# Patient Record
Sex: MALE | Race: WHITE | NOT HISPANIC OR LATINO | Employment: FULL TIME | URBAN - METROPOLITAN AREA
[De-identification: names, ages, dates, MRNs, and addresses within clinical notes are randomized per-mention and may not be internally consistent; named-entity substitution may affect disease eponyms.]

---

## 2020-04-21 DIAGNOSIS — Z01.84 ANTIBODY RESPONSE EXAMINATION: ICD-10-CM

## 2020-05-14 ENCOUNTER — LAB VISIT (OUTPATIENT)
Dept: LAB | Facility: OTHER | Age: 30
End: 2020-05-14
Attending: INTERNAL MEDICINE
Payer: COMMERCIAL

## 2020-05-14 DIAGNOSIS — Z01.84 ANTIBODY RESPONSE EXAMINATION: ICD-10-CM

## 2020-05-14 LAB — SARS-COV-2 IGG SERPLBLD QL IA.RAPID: NEGATIVE

## 2020-05-14 PROCEDURE — 36415 COLL VENOUS BLD VENIPUNCTURE: CPT

## 2020-05-14 PROCEDURE — 86769 SARS-COV-2 COVID-19 ANTIBODY: CPT

## 2020-12-02 ENCOUNTER — CLINICAL SUPPORT (OUTPATIENT)
Dept: URGENT CARE | Facility: CLINIC | Age: 30
End: 2020-12-02
Payer: COMMERCIAL

## 2020-12-02 DIAGNOSIS — Z11.9 SCREENING EXAMINATION FOR UNSPECIFIED INFECTIOUS DISEASE: Primary | ICD-10-CM

## 2020-12-02 LAB
CTP QC/QA: YES
SARS-COV-2 RDRP RESP QL NAA+PROBE: NEGATIVE

## 2020-12-02 PROCEDURE — U0002: ICD-10-PCS | Mod: QW,S$GLB,, | Performed by: FAMILY MEDICINE

## 2020-12-02 PROCEDURE — U0002 COVID-19 LAB TEST NON-CDC: HCPCS | Mod: QW,S$GLB,, | Performed by: FAMILY MEDICINE

## 2020-12-02 PROCEDURE — 99211 PR OFFICE/OUTPT VISIT, EST, LEVL I: ICD-10-PCS | Mod: S$GLB,CS,, | Performed by: FAMILY MEDICINE

## 2020-12-02 PROCEDURE — 99211 OFF/OP EST MAY X REQ PHY/QHP: CPT | Mod: S$GLB,CS,, | Performed by: FAMILY MEDICINE

## 2020-12-02 NOTE — PROGRESS NOTES
Subjective:       Patient ID: Joey Ordaz is a 30 y.o. male.    Chief Complaint: Essential Covid Test    Essential covid test. This is not being done through employee health. [facility]    [place]     Objective:      Physical Exam    Assessment:       1. Screening examination for unspecified infectious disease        Plan:                   No follow-ups on file.

## 2020-12-22 ENCOUNTER — IMMUNIZATION (OUTPATIENT)
Dept: INTERNAL MEDICINE | Facility: CLINIC | Age: 30
End: 2020-12-22
Payer: COMMERCIAL

## 2020-12-22 DIAGNOSIS — Z23 NEED FOR VACCINATION: ICD-10-CM

## 2020-12-22 PROCEDURE — 91300 COVID-19, MRNA, LNP-S, PF, 30 MCG/0.3 ML DOSE VACCINE: CPT | Mod: ,,, | Performed by: INTERNAL MEDICINE

## 2020-12-22 PROCEDURE — 0001A COVID-19, MRNA, LNP-S, PF, 30 MCG/0.3 ML DOSE VACCINE: ICD-10-PCS | Mod: CV19,,, | Performed by: INTERNAL MEDICINE

## 2020-12-22 PROCEDURE — 91300 COVID-19, MRNA, LNP-S, PF, 30 MCG/0.3 ML DOSE VACCINE: ICD-10-PCS | Mod: ,,, | Performed by: INTERNAL MEDICINE

## 2020-12-22 PROCEDURE — 0001A COVID-19, MRNA, LNP-S, PF, 30 MCG/0.3 ML DOSE VACCINE: CPT | Mod: CV19,,, | Performed by: INTERNAL MEDICINE

## 2020-12-31 ENCOUNTER — OFFICE VISIT (OUTPATIENT)
Dept: URGENT CARE | Facility: CLINIC | Age: 30
End: 2020-12-31
Payer: COMMERCIAL

## 2020-12-31 VITALS
WEIGHT: 140 LBS | BODY MASS INDEX: 20.04 KG/M2 | OXYGEN SATURATION: 97 % | TEMPERATURE: 98 F | SYSTOLIC BLOOD PRESSURE: 95 MMHG | DIASTOLIC BLOOD PRESSURE: 68 MMHG | HEIGHT: 70 IN | HEART RATE: 95 BPM | RESPIRATION RATE: 16 BRPM

## 2020-12-31 DIAGNOSIS — K13.0 LESION OF LIP: Primary | ICD-10-CM

## 2020-12-31 PROCEDURE — 99213 PR OFFICE/OUTPT VISIT, EST, LEVL III, 20-29 MIN: ICD-10-PCS | Mod: S$GLB,,, | Performed by: PHYSICIAN ASSISTANT

## 2020-12-31 PROCEDURE — 99213 OFFICE O/P EST LOW 20 MIN: CPT | Mod: S$GLB,,, | Performed by: PHYSICIAN ASSISTANT

## 2020-12-31 PROCEDURE — 87529 HSV DNA AMP PROBE: CPT

## 2020-12-31 RX ORDER — MUPIROCIN 20 MG/G
OINTMENT TOPICAL 3 TIMES DAILY
Qty: 1 TUBE | Refills: 0 | Status: SHIPPED | OUTPATIENT
Start: 2020-12-31 | End: 2021-01-05

## 2020-12-31 RX ORDER — ACYCLOVIR 400 MG/1
400 TABLET ORAL 3 TIMES DAILY
Qty: 30 TABLET | Refills: 0 | Status: SHIPPED | OUTPATIENT
Start: 2020-12-31 | End: 2021-01-07

## 2020-12-31 NOTE — PATIENT INSTRUCTIONS
Understanding Cold Sores  Cold sores are small blisters or sores on the lip or sometimes inside the mouth. Many people get them from time to time. Cold sores usually are not serious, and they usually heal in a week or two. They are caused by 2 related viruses, herpes simplex type 1 and 2. These viruses spread very easily. Many people have one or both of these viruses in their body. More than 4 in every 5 people are infected with herpes simplex type 1. Once you have the virus that causes cold sores, it stays in your body for the rest of your life. But it can be inactive for long periods.  What causes a cold sore?  Cold sores are usually caused by herpes simplex virus type 1. Less often, they are caused by herpes simplex virus type 2. Herpes simplex virus type 2 is the more common cause of genital sores. The herpes viruses can enter the body through a break in the skin such as a scrape. Or they may enter through mucous membranes such as the lips or mouth. Some ways to get the viruses include:  · Kissing someone who has a cold sore  · Sharing a drinking glass, eating utensils, or lip balm with someone who has a cold sore  · Having oral sex with someone who has a cold sore  A  baby can also get the infection at birth.  If you have a herpes virus, you can to pass it along even when you dont have a sore.  Cold sores flare up occasionally. Things that can cause an outbreak include:  · Sun exposure  · Fever  · Stress or exhaustion  · Menstruation  · Skin irritation  · Another unrelated Illness such as pneumonia, urinary infection, or cancer  What are the symptoms of a cold sore?  Symptoms can include:  · A blister-like sore or cluster of sores. These often occur at the edge of the lips but may appear inside the mouth.  · Skin redness around the sores.  · Pain or itching in the area of the outbreak. Often the pain or itching develops 12 to 24 hours before the sore become visible.  · Flu-like symptoms, including  swollen glands, headache, body ache, or fever. These typically occur only at the time of the first infection.  Cold sores may also occur on fingers. They may rarely infect the eyes, a serious possible complication.  Some people have symptoms a day or two before an outbreak. They may feel tenderness, burning, itching, or tingling before a cold sore appears. Cold sores tend to come back in the same area that they first appeared.  How are cold sores treated?  Treatment for cold sores focuses on relieving and shortening symptoms. For people with frequent outbreaks, treatment works to decrease how often future episodes.  Treatments may include:  · Prescription or over-the-counter pain medicines. These can help with discomfort, especially if sores are inside the mouth.  · Antiviral medicines. These may be pills that are taken by mouth or a cream to apply to sores. They may help shorten an outbreak and reduce the severity of symptoms.  They may be used to help prevent future outbreaks if you have disabling recurrent infections.  · Self-care such as extra rest and drinking more fluids. These may help relieve the flu-like symptoms of a first outbreak.  How are cold sores diagnosed?  Your healthcare provider makes the diagnosis mainly by looking at the sores and doing a clinical exam.  This may be confirmed by swab tests or blood tests.  How can I prevent cold sores?  You can help reduce the spread of the herpes viruses that cause cold sores. This can help both you and others avoid getting cold sores. Follow these tips:  · Do not kiss others if you have a cold sore. Also avoid kissing someone with a cold sore.  · Do not share eating utensils, lip balm, razors, or towels with someone who has a cold sore.  · Wash your hands after touching the area of a cold sore. The herpes virus can be carried from your face to your hands when you touch the area of a cold sore. When this happens, wash your hands thoroughly, for at least 20  seconds. When you cant wash with soap and water, use an alcohol-based hand .  · Disinfect things you touch often, such as phones and keyboards.    · If you feel a cold sore coming on, do the same things you would do when a cold sore is present to avoid spreading the virus.  · Use condoms to help prevent passing on the viruses through sex.  What are the possible complications of a cold sore?  Cold sores usually go away by themselves within 2 weeks. For most people cold sores are not serious. The viruses that cause cold sores can cause more serious illness, though. People who have a weak immune system may get more serious infections from herpes viruses. These include people being treated for cancer or who have HIV disease. Babies may also become very ill from a herpes infection.      When should I call my healthcare provider?  Call your healthcare provider right away if you have any of these:  · Fever of 100.4°F (38°C) or higher, or as directed  · Pain that gets worse  · You cannot eat or drink because of painful sores  · Symptoms dont get better within 5 to 7 days  · Blisters spread beyond the mouth or lip to areas on the chest, arms, face, or legs   Date Last Reviewed: 3/28/2016  © 2935-6597 "[employer]. [address] [place] [address], [place] [address]. All rights reserved. This information is not intended as a substitute for professional medical care. Always follow your healthcare professional's instructions.

## 2020-12-31 NOTE — PROGRESS NOTES
"Subjective:       Patient ID: Joey Ordaz is a 30 y.o. male.    Vitals:  height is 5' 10" (1.778 m) and weight is 63.5 kg (140 lb). His temperature is 98 °F (36.7 °C). His blood pressure is 95/68 and his pulse is 95. His respiration is 16 and oxygen saturation is 97%.     Chief Complaint: Mass (upper lip)    Pt c/o mass to upper lip that he noticed about 3-4 days ago. He states that he is concerned that it is HSV. He has states he had an outbreak for oral herpes as a young child. He states he is concerned that it is highly contagious and wants to be sure for his girlfriend's sake that it is not HSV. The patient denies pain, prodromal tingling, clear and pus like drainage, fever, or chills.         Mass  This is a new problem. Episode onset: three days. The problem occurs intermittently. The problem has been gradually worsening. Pertinent negatives include no arthralgias, chest pain, chills, congestion, coughing, fatigue, fever, headaches, joint swelling, myalgias, nausea, rash, sore throat, vertigo or vomiting. Nothing aggravates the symptoms. He has tried nothing for the symptoms.       Constitution: Negative for chills, fatigue and fever.   HENT: Negative for congestion and sore throat.    Neck: Negative for painful lymph nodes.   Cardiovascular: Negative for chest pain and leg swelling.   Eyes: Negative for double vision and blurred vision.   Respiratory: Negative for cough and shortness of breath.    Gastrointestinal: Negative for nausea, vomiting and diarrhea.   Genitourinary: Negative for dysuria, frequency and urgency.   Musculoskeletal: Negative for joint pain, joint swelling, muscle cramps and muscle ache.   Skin: Positive for lesion. Negative for color change, pale, rash and erythema.   Allergic/Immunologic: Negative for seasonal allergies.   Neurological: Negative for dizziness, history of vertigo, light-headedness, passing out and headaches.   Hematologic/Lymphatic: Negative for swollen lymph nodes, easy " bruising/bleeding and history of blood clots. Does not bruise/bleed easily.   Psychiatric/Behavioral: Negative for nervous/anxious, sleep disturbance and depression. The patient is not nervous/anxious.        Objective:      Physical Exam   Constitutional: He is oriented to person, place, and time. He appears well-developed.   HENT:   Head: Normocephalic and atraumatic. Head is without abrasion, without contusion and without laceration.   Ears:   Right Ear: External ear normal.   Left Ear: External ear normal.   Nose: Nose normal.   Mouth/Throat: Oropharynx is clear and moist and mucous membranes are normal.   Eyes: Pupils are equal, round, and reactive to light. Conjunctivae, EOM and lids are normal.   Neck: Trachea normal, full passive range of motion without pain and phonation normal. Neck supple.   Cardiovascular: Normal rate, regular rhythm and normal heart sounds.   Pulmonary/Chest: Effort normal and breath sounds normal. No stridor. No respiratory distress.   Musculoskeletal: Normal range of motion.   Neurological: He is alert and oriented to person, place, and time.   Skin: Skin is warm, dry, intact and no rash. Capillary refill takes less than 2 seconds. abrasion, burn, bruising, erythema and ecchymosis     Psychiatric: His speech is normal and behavior is normal. Judgment and thought content normal.   Nursing note and vitals reviewed.        Assessment:       1. Lesion of lip        Plan:         Lesion of lip  -     Viral Culture, Rapid, Lesion (HSV and VZV)  -     acyclovir (ZOVIRAX) 400 MG tablet; Take 1 tablet (400 mg total) by mouth 3 (three) times daily. for 7 days  Dispense: 30 tablet; Refill: 0  -     mupirocin (BACTROBAN) 2 % ointment; Apply topically 3 (three) times daily. for 5 days  Dispense: 1 Tube; Refill: 0    Discussed use of antibiotic ointment to help with prevention of infection of sore.   Discussed wait and see order for acyclovir if patient's viral culture is positive for HSV-1 patient  is instructed to fill that prescription.   Discussed RTC if symptoms do not improve, change, or worsen.     Selin Lockhart PA-C       Patient Instructions       Understanding Cold Sores  Cold sores are small blisters or sores on the lip or sometimes inside the mouth. Many people get them from time to time. Cold sores usually are not serious, and they usually heal in a week or two. They are caused by 2 related viruses, herpes simplex type 1 and 2. These viruses spread very easily. Many people have one or both of these viruses in their body. More than 4 in every 5 people are infected with herpes simplex type 1. Once you have the virus that causes cold sores, it stays in your body for the rest of your life. But it can be inactive for long periods.  What causes a cold sore?  Cold sores are usually caused by herpes simplex virus type 1. Less often, they are caused by herpes simplex virus type 2. Herpes simplex virus type 2 is the more common cause of genital sores. The herpes viruses can enter the body through a break in the skin such as a scrape. Or they may enter through mucous membranes such as the lips or mouth. Some ways to get the viruses include:  · Kissing someone who has a cold sore  · Sharing a drinking glass, eating utensils, or lip balm with someone who has a cold sore  · Having oral sex with someone who has a cold sore  A  baby can also get the infection at birth.  If you have a herpes virus, you can to pass it along even when you dont have a sore.  Cold sores flare up occasionally. Things that can cause an outbreak include:  · Sun exposure  · Fever  · Stress or exhaustion  · Menstruation  · Skin irritation  · Another unrelated Illness such as pneumonia, urinary infection, or cancer  What are the symptoms of a cold sore?  Symptoms can include:  · A blister-like sore or cluster of sores. These often occur at the edge of the lips but may appear inside the mouth.  · Skin redness around the  sores.  · Pain or itching in the area of the outbreak. Often the pain or itching develops 12 to 24 hours before the sore become visible.  · Flu-like symptoms, including swollen glands, headache, body ache, or fever. These typically occur only at the time of the first infection.  Cold sores may also occur on fingers. They may rarely infect the eyes, a serious possible complication.  Some people have symptoms a day or two before an outbreak. They may feel tenderness, burning, itching, or tingling before a cold sore appears. Cold sores tend to come back in the same area that they first appeared.  How are cold sores treated?  Treatment for cold sores focuses on relieving and shortening symptoms. For people with frequent outbreaks, treatment works to decrease how often future episodes.  Treatments may include:  · Prescription or over-the-counter pain medicines. These can help with discomfort, especially if sores are inside the mouth.  · Antiviral medicines. These may be pills that are taken by mouth or a cream to apply to sores. They may help shorten an outbreak and reduce the severity of symptoms.  They may be used to help prevent future outbreaks if you have disabling recurrent infections.  · Self-care such as extra rest and drinking more fluids. These may help relieve the flu-like symptoms of a first outbreak.  How are cold sores diagnosed?  Your healthcare provider makes the diagnosis mainly by looking at the sores and doing a clinical exam.  This may be confirmed by swab tests or blood tests.  How can I prevent cold sores?  You can help reduce the spread of the herpes viruses that cause cold sores. This can help both you and others avoid getting cold sores. Follow these tips:  · Do not kiss others if you have a cold sore. Also avoid kissing someone with a cold sore.  · Do not share eating utensils, lip balm, razors, or towels with someone who has a cold sore.  · Wash your hands after touching the area of a cold  sore. The herpes virus can be carried from your face to your hands when you touch the area of a cold sore. When this happens, wash your hands thoroughly, for at least 20 seconds. When you cant wash with soap and water, use an alcohol-based hand .  · Disinfect things you touch often, such as phones and keyboards.    · If you feel a cold sore coming on, do the same things you would do when a cold sore is present to avoid spreading the virus.  · Use condoms to help prevent passing on the viruses through sex.  What are the possible complications of a cold sore?  Cold sores usually go away by themselves within 2 weeks. For most people cold sores are not serious. The viruses that cause cold sores can cause more serious illness, though. People who have a weak immune system may get more serious infections from herpes viruses. These include people being treated for cancer or who have HIV disease. Babies may also become very ill from a herpes infection.      When should I call my healthcare provider?  Call your healthcare provider right away if you have any of these:  · Fever of 100.4°F (38°C) or higher, or as directed  · Pain that gets worse  · You cannot eat or drink because of painful sores  · Symptoms dont get better within 5 to 7 days  · Blisters spread beyond the mouth or lip to areas on the chest, arms, face, or legs   Date Last Reviewed: 3/28/2016  © 5236-1512 [employer]. [address] [place] [address], [place] [address]. All rights reserved. This information is not intended as a substitute for professional medical care. Always follow your healthcare professional's instructions.

## 2021-01-04 LAB
HSV1 DNA SPEC QL NAA+PROBE: POSITIVE
HSV2 DNA SPEC QL NAA+PROBE: NEGATIVE
SPECIMEN SOURCE: ABNORMAL

## 2021-01-06 ENCOUNTER — TELEPHONE (OUTPATIENT)
Dept: URGENT CARE | Facility: CLINIC | Age: 31
End: 2021-01-06

## 2021-01-13 ENCOUNTER — IMMUNIZATION (OUTPATIENT)
Dept: INTERNAL MEDICINE | Facility: CLINIC | Age: 31
End: 2021-01-13
Payer: COMMERCIAL

## 2021-01-13 DIAGNOSIS — Z23 NEED FOR VACCINATION: ICD-10-CM

## 2021-01-13 PROCEDURE — 91300 COVID-19, MRNA, LNP-S, PF, 30 MCG/0.3 ML DOSE VACCINE: CPT | Mod: PBBFAC | Performed by: INTERNAL MEDICINE

## 2021-01-13 PROCEDURE — 0002A COVID-19, MRNA, LNP-S, PF, 30 MCG/0.3 ML DOSE VACCINE: CPT | Mod: PBBFAC | Performed by: INTERNAL MEDICINE

## 2025-02-06 ENCOUNTER — OFFICE VISIT (OUTPATIENT)
Dept: INTERNAL MEDICINE | Facility: CLINIC | Age: 35
End: 2025-02-06
Payer: COMMERCIAL

## 2025-02-06 ENCOUNTER — IMMUNIZATION (OUTPATIENT)
Dept: INTERNAL MEDICINE | Facility: CLINIC | Age: 35
End: 2025-02-06
Payer: COMMERCIAL

## 2025-02-06 VITALS
BODY MASS INDEX: 20.36 KG/M2 | WEIGHT: 142.19 LBS | DIASTOLIC BLOOD PRESSURE: 65 MMHG | HEIGHT: 70 IN | HEART RATE: 68 BPM | SYSTOLIC BLOOD PRESSURE: 105 MMHG

## 2025-02-06 DIAGNOSIS — F32.A DEPRESSION, UNSPECIFIED DEPRESSION TYPE: ICD-10-CM

## 2025-02-06 DIAGNOSIS — Z23 NEED FOR VACCINATION: Primary | ICD-10-CM

## 2025-02-06 DIAGNOSIS — G89.29 CHRONIC MIDLINE LOW BACK PAIN WITHOUT SCIATICA: ICD-10-CM

## 2025-02-06 DIAGNOSIS — Z72.0 VAPES NICOTINE CONTAINING SUBSTANCE: ICD-10-CM

## 2025-02-06 DIAGNOSIS — M54.50 CHRONIC MIDLINE LOW BACK PAIN WITHOUT SCIATICA: ICD-10-CM

## 2025-02-06 DIAGNOSIS — Z00.00 ANNUAL PHYSICAL EXAM: Primary | ICD-10-CM

## 2025-02-06 PROCEDURE — 3008F BODY MASS INDEX DOCD: CPT | Mod: CPTII,S$GLB,,

## 2025-02-06 PROCEDURE — 90471 IMMUNIZATION ADMIN: CPT | Mod: S$GLB,,, | Performed by: INTERNAL MEDICINE

## 2025-02-06 PROCEDURE — 3078F DIAST BP <80 MM HG: CPT | Mod: CPTII,S$GLB,,

## 2025-02-06 PROCEDURE — 99999 PR PBB SHADOW E&M-NEW PATIENT-LVL IV: CPT | Mod: PBBFAC,,,

## 2025-02-06 PROCEDURE — 99385 PREV VISIT NEW AGE 18-39: CPT | Mod: GE,S$GLB,,

## 2025-02-06 PROCEDURE — 90656 IIV3 VACC NO PRSV 0.5 ML IM: CPT | Mod: S$GLB,,, | Performed by: INTERNAL MEDICINE

## 2025-02-06 PROCEDURE — 3074F SYST BP LT 130 MM HG: CPT | Mod: CPTII,S$GLB,,

## 2025-02-06 NOTE — PROGRESS NOTES
"[facility]  RESIDENT CONTINUITY CLINIC NOTE    Name: Joey Ordaz  : 1990  MRN: [id number]  Date of Service: 2025   PCP: Randall Olivarez MD          HPI:           Joey Ordaz is a 34 y.o. male with no significant PMHx presents to clinic to establish care. Has been focusing more on his health. Has noticed improvement in his overall health. Patient also wanted to discuss the following:     - Recent L ear infection 1-2 months ago. Around the same time found to have an enlarged node on the back of his neck. Has decreased in size. Patient asks whether this could be cancer.     - Back pain, knee pain, leg pain - Patient has chronic low back pain since high school. Also has knee pain with working out. Shin/calf pain occurs about once or twice a month. Denies pain that radiates from his back down to his legs.     - Rash - Red spots appear almost randomly on his abdomen. None today. Instructed patient to take a picture once it appears. Not associated with pruritus or pain.     - Depressed mood - Participates in talk therapy at school. Interested in additional behavioral therapy.      Cardiovascular screening:  Diet: vegetarian for 12 years; eggs, toast, vegan yogurt, bagel, coffee    Activity: gym 4x a week with weightlifting and biking (started 5 months ago)    HTN: Most recent BPs:   BP Readings from Last 3 Encounters:   25 105/65   20 95/68         HLD: Most recent lipid panels:    Discussed that all pts age >=35, or age 25 with 2+ of HTN, HLD, smoker, or DM2 should have annual lipids panel drawn.    Diabetes: Most recent A1c:   Lab Results   Component Value Date    HGBA1C 4.1 2022       Cancer screening:  Tobacco: Cigarette smoker from 18 years old to 4 months ago. Now vapes. Says it is essentially "impossible to stop"    Immunizations:  Influenza every year: interested  TDaP every 10 years: interested  Pneumococcal vaccine: all adults >65 or adults age " "19-64 with certain underlying medical conditions should receive PCV20 or PCV15 + PPSV23. Pt has not received it.  Covid interested      STDs/Bloodborne screening:  Hepatitis C: All pts aged 18-79. He has not been previously screened for it.  HIV: All pts aged 18-65. He has not been previously screened for it.      Social screening:    PHQ9  1. Little interest or pleasure in doing things? Several days                = 1    2. Feeling down, depressed, or hopeless? More than half of days  = 2    3. Trouble falling or staying asleep, or sleeping too much? Nearly every day           = 3    4. Feeling tired or having little energy? More than half of days  = 2    5. Poor appetite or overeating? Several days                = 1    6. Feeling bad about yourself- or that you are a failure or have let yourself or your family down? Nearly every day           = 3    7. Trouble concentrating on things, such as reading the newspaper or watching TV? More than half of days  = 2    8. Moving or speaking so slowly that other people could have noticed? Or the opposite- being so fidgety or restless that you have been moving around a lot more than usual? More than half of days  = 2    9. Thoughts that you would be better off dead or of hurting yourself in some way? Not at all                       = 0    Total Score: 16        Review of systems as above in HPI; all unmentioned systems are negative.      PEX:     Vitals:    02/06/25 0827   BP: 105/65   BP Location: Left arm   Patient Position: Sitting   Pulse: 68   Weight: 64.5 kg (142 lb 3.2 oz)   Height: 5' 10" (1.778 m)     Body mass index is 20.4 kg/m².    Physical Exam  Vitals reviewed.   Constitutional:       Appearance: Normal appearance.   HENT:      Head: Normocephalic and atraumatic.      Right Ear: External ear normal.      Left Ear: External ear normal.      Nose: Nose normal.      Mouth/Throat:      Pharynx: Oropharynx is clear.   Neck:      Comments: Left sided occipital LN. " Soft, nontender.   Cardiovascular:      Rate and Rhythm: Normal rate and regular rhythm.      Pulses: Normal pulses.      Heart sounds: Normal heart sounds.   Pulmonary:      Effort: Pulmonary effort is normal.      Breath sounds: Normal breath sounds.   Abdominal:      General: There is no distension.      Palpations: Abdomen is soft.      Tenderness: There is no abdominal tenderness.   Musculoskeletal:      Right lower leg: No edema.      Left lower leg: No edema.      Comments: SLR test negative BL   Skin:     General: Skin is warm and dry.   Neurological:      General: No focal deficit present.      Mental Status: He is alert and oriented to person, place, and time.   Psychiatric:         Mood and Affect: Mood normal.         Behavior: Behavior normal.            Other pertinent data from chart that formed part of my MDM:             Current Outpatient Medications:     COVID-19 (COMIRNATY -, 12Y UP,,PF,) 30 mcg/0.3 mL IM vaccine (>/= 11 yo), Inject 0.3 mLs into the muscle once. for 1 dose, Disp: 0.3 mL, Rfl: 0    diphth,pertus,acell,,tetanus (BOOSTRIX TDAP) 2.5-8-5 Lf-mcg-Lf/0.5mL Syrg injection, Inject 0.5 mLs into the muscle once. for 1 dose, Disp: 0.5 mL, Rfl: 0   History reviewed. No pertinent past medical history.  Past Surgical History:   Procedure Laterality Date    NASAL SEPTUM SURGERY       Family History   Problem Relation Name Age of Onset    Pulmonary fibrosis Mother      No Known Problems Father       Social History     Occupational History    Occupation: Student at [employer]   Tobacco Use    Smoking status: Every Day     Current packs/day: 0.00     Average packs/day: 0.3 packs/day for 16.7 years (5.0 ttl pk-yrs)     Types: Cigarettes, Vaping with nicotine     Start date:      Last attempt to quit: 10/2024     Years since quittin.3    Smokeless tobacco: Never   Substance and Sexual Activity    Alcohol use: Yes     Comment: Social occasions    Sexual activity: Yes       Labs: Previous labs  reviewed.     Imaging: Previous imaging reviewed.     Assessment and Plan:       Annual physical exam  34 year old otherwise healthy male here to establish care. Given age and absence of co-morbidities, will hold off on blood work. Can consider it as well as one time HIV/HCV screen on follow up visit. Will also discuss vaping cessation options if he feels ready.    Plan to receive flu, COVID, and Tdap vaccine.      Depression, unspecified depression type  PHQ9 score of 16. Suggestive of suggest moderately severe depression. Will refer to Psychology as well as Primary Care Behavioral Health. Patient would like to try these before starting any medications.     -     Ambulatory referral/consult to Psychology; Future; Expected date: 02/13/2025  -     Ambulatory referral/consult to Primary Care Behavioral Health (Non-Opioids); Future; Expected date: 02/13/2025      Chronic midline low back pain without sciatica  -     Ambulatory Referral/Consult to Physical/Occupational Therapy; Future; Expected date: 02/13/2025        RTC in 6-12 months      Discussed with Dr. Liz, further recommendations as per attending addendum. Please feel free to call with any questions or concerns.    Randall Olivarez MD  Resident Continuity Clinic, PGY-I  [facility]  [address] [place] [address]  [phone]    Visit today included increased complexity associated with the care of the episodic problem  addressed and managing the longitudinal care of the patient due to the serious and/or complex managed problem(s).

## 2025-02-07 ENCOUNTER — PATIENT MESSAGE (OUTPATIENT)
Dept: BEHAVIORAL HEALTH | Facility: CLINIC | Age: 35
End: 2025-02-07
Payer: COMMERCIAL

## 2025-02-07 ENCOUNTER — PATIENT MESSAGE (OUTPATIENT)
Dept: INTERNAL MEDICINE | Facility: CLINIC | Age: 35
End: 2025-02-07
Payer: COMMERCIAL

## 2025-03-05 ENCOUNTER — PATIENT MESSAGE (OUTPATIENT)
Dept: INTERNAL MEDICINE | Facility: CLINIC | Age: 35
End: 2025-03-05
Payer: COMMERCIAL

## 2025-03-05 NOTE — TELEPHONE ENCOUNTER
Pt inquiring about intent to travel abroad  Should pt be getting any vaccines prior to trip?    Pt also inquiring about Phys Therapy

## 2025-03-15 DIAGNOSIS — Z71.84 TRAVEL ADVICE ENCOUNTER: Primary | ICD-10-CM

## 2025-03-20 ENCOUNTER — CLINICAL SUPPORT (OUTPATIENT)
Dept: INFECTIOUS DISEASES | Facility: CLINIC | Age: 35
End: 2025-03-20
Payer: COMMERCIAL

## 2025-03-20 DIAGNOSIS — Z71.84 TRAVEL ADVICE ENCOUNTER: Primary | ICD-10-CM

## 2025-03-20 DIAGNOSIS — Z23 NEED FOR IMMUNIZATION AGAINST TYPHOID: Primary | ICD-10-CM

## 2025-03-20 PROCEDURE — 99999 PR PBB SHADOW E&M-EST. PATIENT-LVL I: CPT | Mod: PBBFAC,,,

## 2025-03-20 NOTE — PROGRESS NOTES
Pt received Typhoid vaccine IM to left deltoid, pt tolerated injection well and departed from clinic in [facility].

## 2025-03-26 ENCOUNTER — OFFICE VISIT (OUTPATIENT)
Dept: PSYCHIATRY | Facility: CLINIC | Age: 35
End: 2025-03-26
Payer: COMMERCIAL

## 2025-03-26 VITALS
HEART RATE: 102 BPM | SYSTOLIC BLOOD PRESSURE: 117 MMHG | WEIGHT: 144.81 LBS | DIASTOLIC BLOOD PRESSURE: 77 MMHG | BODY MASS INDEX: 20.78 KG/M2

## 2025-03-26 DIAGNOSIS — F84.0 HIGH-FUNCTIONING AUTISM SPECTRUM DISORDER: Primary | ICD-10-CM

## 2025-03-26 DIAGNOSIS — R41.840 IMPAIRED CONCENTRATION: ICD-10-CM

## 2025-03-26 DIAGNOSIS — F33.0 MILD EPISODE OF RECURRENT MAJOR DEPRESSIVE DISORDER: ICD-10-CM

## 2025-03-26 DIAGNOSIS — F41.9 ANXIETY DISORDER, UNSPECIFIED TYPE: ICD-10-CM

## 2025-03-26 DIAGNOSIS — Z72.0 CURRENT EVERY DAY NICOTINE VAPING: ICD-10-CM

## 2025-03-26 DIAGNOSIS — F12.21 CANNABIS USE DISORDER, SEVERE, IN EARLY REMISSION: ICD-10-CM

## 2025-03-26 PROBLEM — R68.89 SUSPECTED AUTISM DISORDER: Status: ACTIVE | Noted: 2025-03-26

## 2025-03-26 PROCEDURE — 99999 PR PBB SHADOW E&M-EST. PATIENT-LVL II: CPT | Mod: PBBFAC,,, | Performed by: PSYCHIATRY & NEUROLOGY

## 2025-03-26 RX ORDER — ESCITALOPRAM OXALATE 10 MG/1
TABLET ORAL
Qty: 30 TABLET | Refills: 1 | Status: SHIPPED | OUTPATIENT
Start: 2025-03-26

## 2025-03-26 NOTE — PROGRESS NOTES
"PSYCHIATRIC EVALUATION    Disclaimer: Evaluation and treatment is based on information presented to date. Any new information may affect assessment and findings.     Note:Face to Face time with patient: 90 minutes of total time spent on the encounter, which includes face to face time and non-face to face time preparing to see the patient including but not limited to: 1) Obtaining and/or reviewing separately obtained history, 2) Documenting clinical information in the electronic or other health record, 3) Independently  reviewing / interpreting results as clinically indicated ; 4) discussing medication options including risks and benefits as well as 5) recommendations  for therapeutic interventions and 5) where appropriate additional educational resources (ex: reference books / websites); 6) communicating assessment and plan of care to the patient/family/caregiver  7) explaining importance of keeping appts ; and 8) rescheduling IF miss appt  IF acute concerns to call 911 or go to nearest ER.     NOTE:This note may have been partially created using a dictation device.  As such, there may be word recognition mistakes, grammatical errors, garbled syntax, and/or  other bizarre constructions that may not have been identified despite a good cindy review prior to signing. Please interpret accordingly. If questions still remain , contact this MD via [facility]: [phone].    NOTE: The addition of COLOR FONT and / or FORMATTING (such as use of  Underline or Italics)  may have lj added by GEM Gardiner MD.    Name: Joey Ordaz  Age: 34 y.o. 1990  Date of Encounter: 3/26/2025    Sources of Information:  Patient  Interview and chart review     Chief Complaint:  "primary CARE placed referral with limited info citing depression tho pt himself expounds saying people says I am autistic / ADHD (says was not diag or treated previously ; says been going [facility] ; seen by intern (no records accompany ; " "tho asks to get bring in or fax sarai my office" / does acknowledge HISTORY use CANNABIS from 15 yrs to 33 yrs DAILY 4 or 5 joints a day; says stopped aound Jan 2025 (ie, EARLY REMISSION) ;tho moved to DAILY [place] (good part of day)    Referred by: (Whose idea to come see Psychiatry) : Randall Olivarez MD paced referral     History of Present Illness    Joey Ordaz a 34 y.o.  male presents today as referred by Randall Olivarez MD    [facility] ; told pt that may be slightly autistic and ADHD ; exhibits poor eye contact ; says  mom passed away  unexpectedly (born 1954; passed way;   Says struggles to get on good schedule    [facility]/  [facility],   [address] [place] [address] //  [phone]   [email or web address]     When asked about having friends;     IF traditional interests says he "CHECKS OUT" with those friends; way they talk in group chat not make sense to him ; ex: may make andrez comment tho liberal and not understand why they say ;     Born raised  [place]    Came to [place] when 6 yrs ago when 34 yr old ; at time had girlfriend (Martine: 39 yr old; met at a job in [place]) ; saw her 2 yrs; then friends ; never intimate with her ; says she was emotionally mean to him at end     MD distinctly Called his name x 2 in wait area no reply ; on 3rd time he stood up ; as  MD walking out wait area; and and MD turned around and noticed he was not follwonig th standing in doorway; MD walked back again to get him and he said: "I thought you said to stay here (tho MD did not say such)     Says he tries to work from home tho not get much done; rather does better in coffee shop as more stimulated     Has been working video editing; 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 ; studying computer science; doing at [employer]; says was doing well; got 89% / 1 point off if talk self tho in closed room; IF had not     Now [facility] and to get post " "bachelor degree computer science ; doing coding; been doing few months    Says he is not good about self identifying his own symptoms ;says bases reports to me "on what people and friends tell him."    Social Deficits:   OTHER THAN Chess activity he says keeps good bit to self ; does go online and play video games with brother ONLINE / has one friend named ronel who is   mentoring him on "[employer]" computer coding on: [employer]  ; says Rarely talk of personal life to others ; when comes up feels uncomfortable;  says feels awkward discussing  awkward ;discussing abilities UNTIL he fully knows something ; wants to hide not discuss until then    Restricted Interests:  IMMERSES self in COMPUTER CODING; and Chess Club   Says his routine is Up 7 a or 8a and  does focus on learning coding during day ; doing a LOT Coding work to learn by [employer] computer language    Says only real social interaction: every 2 weeks goes to  "[employer]" 2x Irish quarter and morning call in [facility] Tuesday night 7 pm [employer]; says ranking  1500 reg and 1750 quick version    Says he was Really good at math as kid raudel in 6th grade; also liked   building things    Did get Bachelor degree (2013)  [employer] //     Living on own now  in 7th [address] [facility] // near [facility]    On Mar 26 2025: pt completed biopsychosocial self rating scale (i.e. Milepost 5.0); see below;   (such also uploaded to EPIC ):               QID SR Depression Screen:    Enter the highest score of the sleep items (Rows 1-4) : 2        3/26/2025     8:12 AM   Results of the QIDS (Sleep Items)   1) Trouble falling asleep? 2   2) Sleeping during the night? 0   3) Waking Up Too Early? 0   4) Sleeping Too Much? 1       Enter the highest score of the appetite/weight items (Rows 6-9) : 1        3/26/2025     8:12 AM   Results of the QIDS (Weight/Appetite)   6) Decreased Appetite? 1   7) Increased " Appetite? 0   8) Decreased Weight in last 2 weeks? 0   9) Increased weight in the last 2 weeks? 1       Enter  the highest score of the psychomotor items (Rows 15-16) : 1        3/26/2025     8:12 AM   Results of the QIDS (Psychomotor)   15) Feeling slowed down:  0   16) Feeling restless:  1           3/26/2025     8:12 AM   Results of the QIDS (Combined Score)   Combined QIDS score of 5 and 10-14 8        Patient-reported        Quick inventory of depression self report Score  QID SR 12    Scoring Criteria  0-5: Normal  6-10: Mild  11-15: Moderate  16-20: Severe  21 and up: Very Severe         3/26/2025     8:01 AM   PHILL-7   1. Feeling nervous, anxious, or on edge? Several days   2. Not being able to stop or control worrying? Several days   3. Worrying too much about different things? Several days   4. Trouble relaxing? Several days   5. Being so restless that it is hard to sit still? More than half the days   6. Becoming easily annoyed or irritable? Several days   7. Feeling afraid as if something awful might happen? Not at all   8. If you checked off any problems, how difficult have these problems made it for you to do your work, take care of things at home, or get along with other people? Somewhat difficult   PHILL-7 Score 7    Number answered (out of first 7) 7    Interpretation Mild Anxiety        Patient-reported           3/26/2025     8:04 AM   MDQ Scale   you felt so good or so hyper that other people thought you were not your normal self or you were so hyper that you got into trouble? 1   you were so irritable that you shouted at people or started fights or arguments? 1   you felt much more self-confident than usual? 1   you got much less sleep than usual and found that you didn't really miss it? 1   you were more talkative or spoke much faster than usual? 1   thoughts raced through your head or you couldn't slow your mind down? 1   you were so easily distracted by things around you that you had trouble  concentrating or staying on track? 1   you had more energy than usual? 1   you were much more active or did many more things than usual? 1   you were much more social or outgoing than usual, for example, you telephoned friends in the middle of the night? 1   you were much more interested in sex than usual? 0   you did things that were unusual for you or that other people might have thought were excessive, foolish, or risky? 1   spending money got you or your family in trouble? 0   If you checked YES to more than one of the above, have several of these ever happened during the same period of time? 0   How much of a problem did any of these cause you - like being unable to work; having family, money or legal troubles; getting into arguments or fights? Moderate problem   Mood Disorder Questionnaire Score  11        Patient-reported      Does not come across manic / rather social awkward; reserved          3/29/2025     4:15 PM   Adult ADHD Self-Report Scale   How often do you have trouble wrapping up the final details of a project once the chanllenging parts have been done? 3   How often do you have difficulty getting things in order when you have to do a task that requires organization? 3   How often do you have problems remembering appointments or obligations? 2   When you have a task that requires a lot of thought, how often do you avoid or delay getting started? 3   How often do you fidget or squirm with your hands or feet when you have to sit down for a long time? 3   How often do you feel overly active and compelled to do things, like you were driven by a motor? 3   Part A Score 17   How often do you make careless mistakes when you have to work on a boring or difficult project? 2   How often do you have difficulty keeping your attention when you are doing boring or repetitive work? 3   How often do you have difficulty concentrating on what people say to you, even when they are speaking to you directly? 3   How  often do you misplace or have difficulty finding things at home or at work? 3   How often are you distracted by activity or noise around you? 1   How often do you leave your seat in meetings or other situations in which you are expected to remain seated? 1   How often do you feel restless or fidgety? 3   How often do you have difficulty unwinding and relaxing when you have time to yourself? 1   How often do you find yourself talking too much when you are in social situations? 2   When you're in a conversation, how often do you find yourself finishing the sentences of the people you are talking to before they can finish them themselves? 2   How often do you have difficulty waiting your turn in situations when turn taking is required? 1   How often do you interrupt others when they are busy? 1   Part B Score 23           Note as initial session: at this juncture addressing mood anxiety and autism spectrum; can revisit concentration issues; also note he acknowledges history cannabis use // use from  15Y UNTIL 33Y ; DAILY 3 TO 4 OR 5 A DAY// Mixture tobacco and cannabis / he was rolling ; stopped cannbis 3 to 4 months ago // straight A when 8th grade // started weed not straight A; no psychosis ; college became more spiritual; does consider self spiritual ; tho was raised Mandaeism; not as Hoahaoism these day; only when visit family  ; stopped when went college;     >>  2-6-25 Excerpt of  [facility] Resident Randall Olivarez MD  // Attending MD: Luke Liz MD    Joey Ordaz is a 34 y.o. male with no significant PMHx presents to clinic to establish care. Has been focusing more on his health. Has noticed improvement in his overall health. Patient also wanted to discuss the following:      - Recent L ear infection 1-2 months ago. Around the same time found to have an enlarged node on the back of his neck. Has decreased in size. Patient asks whether this could be cancer.      - Back pain, knee pain, leg pain - Patient has chronic  "low back pain since high school. Also has knee pain with working out. Shin/calf pain occurs about once or twice a month. Denies pain that radiates from his back down to his legs.      - Rash - Red spots appear almost randomly on his abdomen. None today. Instructed patient to take a picture once it appears. Not associated with pruritus or pain.      - Depressed mood - Participates in talk therapy at school. Interested in additional behavioral therapy.    Past Psychiatric History:   Previous therapy: Yes and [facility]  Previous psychiatric treatment and medication trials:  no meds / tho [facility]   Previous psychiatric hospitalizations: No  Previous diagnoses:  therapist and ex says perhaps "some autism and ADHD"  Previous suicide attempts: No    Abuse History:   Physical Abuse: No  Sexual Abuse: No  Other Abuse / Trauma :  last ex: told him some ugly stuff to him ;     Substance Abuse History:  Use of alcohol:  nop control social occasional     Tobacco use:  VAPING daily; L[place] Air last week "all the time"    started 3 month ago     Cannabis:  15Y UNTIL 33Y ; DAILY 3 TO 4 OR 5 A DAY// Mixture tobacco and cannabis / he was rolling ; stopped cannbis 3 to 4 months ago // straight A when 8th grade // started weed not straight A; no psychosis ; college became more spiritual; does consider self spiritual ; tho was raised Methodist; not as Mormon these day; only when visit family  ; stopped when went college;     Recreational drugs:  denies    Family History Mental Health:   Suicide :  No  Other:  No    Weapons: No    Psyc Meds: no psyc meds     Top 3 Stressors:  can't do basic chores not get groceries . Barely laundries ; trouble get trash out             3/26/2025     8:04 AM   MDQ Scale   you felt so good or so hyper that other people thought you were not your normal self or you were so hyper that you got into trouble? 1   you were so irritable that you shouted at people or started fights or arguments? 1   you felt " much more self-confident than usual? 1   you got much less sleep than usual and found that you didn't really miss it? 1   you were more talkative or spoke much faster than usual? 1   thoughts raced through your head or you couldn't slow your mind down? 1   you were so easily distracted by things around you that you had trouble concentrating or staying on track? 1   you had more energy than usual? 1   you were much more active or did many more things than usual? 1   you were much more social or outgoing than usual, for example, you telephoned friends in the middle of the night? 1   you were much more interested in sex than usual? 0   you did things that were unusual for you or that other people might have thought were excessive, foolish, or risky? 1   spending money got you or your family in trouble? 0   If you checked YES to more than one of the above, have several of these ever happened during the same period of time? 0   How much of a problem did any of these cause you - like being unable to work; having family, money or legal troubles; getting into arguments or fights? Moderate problem   Mood Disorder Questionnaire Score  11        Patient-reported       Review Of Systems:     Medical Review Of Systems:  Patient Active Problem List    Diagnosis Date Noted    Cannabis use disorder, severe, in early remission ; he states: 16yo to 34yo ; (stopped cannbis early 2025  ) DAILY 3 TO 4 OR 5 A DAY// Mixture tobacco and cannabis / // affected grades  no psychosis 03/29/2025    Current every day NICOTINE VAPING / uses frequently thru day //  since Jan 2025 when stopped daily Cannabis 03/29/2025    Anxiety disorder 03/26/2025    Suspected autism spectrum disorder 03/26/2025    High-functioning autism spectrum disorder (mild) elements challenges social cues, restricted interestss 03/26/2025    Self Reports some challenges / Impaired concentration 03/26/2025    Depression 02/06/2025    Chronic midline low back pain without  "sciatica 02/06/2025      Past Surgical History:   Procedure Laterality Date    NASAL SEPTUM SURGERY        Musculoskeletal : no     History Seizures? n    History Head Injury? n    Current Evaluation:     Mental Status Exam:   Appearance: casual /   Oriented: x 3 / including: Date: Mar 2025 ; and [place] [facility]  Attitude: cooperative engaging pleasant   Eye Contact: good   Behavior: calm ; often twirls his curly hair at times   Mood: says some anxiety depression  Cognition: alert / 20-3: 17, 14, 11, 8, 5 ,2   Concentration: grossly intact   Affect: appropriate range   Anxiety: mild / moderate  Thought Process: goal directed   Speech: Volume : WNL   Quantity WNL   Quality: appears to openly answer questions   Eye Contact: good   Insight: recognizes struggling with some self care / chores; HE says "people tell him" may have SOME Autism qualites and or ADHD   Threats: no SI / no HI   Memory: intact (as relay information across time spans) Pres?     TRUMP      Prior Pres?  BIDEN  Psychosis: denies all   Estimate of Intellectual Function: upper average   Judgment (to simple situation): if saw a house on fire / A: go talk to people ; guess I could call 911    Impulse Control: no history SI / nor HI ; calm here     3 wishes ?  Wish get things done    Current Outpatient Medications   Medication Sig    EScitalopram oxalate (LEXAPRO) 10 MG tablet Take HALF tab by mouth in morning for 14 days then take 1 FULL tab by mouth in morning THEREAFTER    hepatitis A and B vaccine, PF, (TWINRIX) 720 MURIEL unit- 20 mcg/mL Syrg suspension 3 total doses: today (3/20/25), in one month, then in 6 months     No current facility-administered medications for this visit.        Psychosocial History :        3/26/2025     8:15 AM   Results of the Psychology History Questionnaire   City and State of birth [place], [place]   Siblings? Yes   Brothers Reji, 32   Sisters Jayne, 41   Mother's name Tiky   Mother alive? No "   Mother's line of work Teacher   Mother worked? Yes   Father's name Thanh   Father alive? Yes   Father's occupation    Did the father work? Yes   Biological parents raised patient Yes   Patient  No   Patient ever been  No   Highest level of completed education Bachelor's degree   Patient spiritual/Evangelical? Not sure/questioning   Last/current job    Longest job patient has worked 2 years    Is patient on disability No   Patient applied for disability No        City Born:     Siblings (full or half)  Brothers: Reji gee [place] / HR  Sisters: Jayne Swenson run consultation business / (blinks a lot)    Parents : alive     Briefly Describe  your Mom: monika side / vuny jokes all time  Briefly Describe your Dad: reserved habitual routine    Bio Mom: Occupation:   Bio Dad:  Occupation:      Marital Status:     Children n    Education: [employer] arts fime / doing coding boot camp [employer]    Judaism: Spiritual / rasied Episcopalian not really practice     Legal Issues? n  DWI ?n  intermediate time?n    Ever homeless? n    Employment:   Sporadic video edit jobs     On Disability?  n    Q: Is there anything else that you think I should know about that I have not asked about? n      Assessment - Diagnosis - Goals:     Encounter Diagnoses   Name Primary?    High-functioning AUTISM SPECTRUM disorder (mild) elements challenges social cues, restricted interestss Yes    HISTORY CANNABIS use disorder, severe, early remission 15yrs old to to 33yrs old ; (stopped cannbis early 2025  ) DAILY 3 TO 4 OR 5 A DAY// Mix of tobacco and cannabis / affected grades  no psychosis     Self Reports some challenges / Impaired concentration     Mild episode of recurrent major depressive disorder     Anxiety disorder, unspecified type     Current every day NICOTINE VAPING / uses frequently thru day //  since Jan 2025 when stopped daily Cannabis         Patient Instructions     PLAN:      Follow Up  May 6 2025 1 pm IN CLINIC     Follow up as doing counselor [facility]    Psyc Meds: mutually agree to add Lexapro 10 mg daily /HALF tab by mouth in morning x 14 days then 1 FULL tab by mouth in mornng thereafter    Records: says he will give fax number to his counselor  / [facility] Mr Gerhard Zaragoza and ask to fax to [facility] [phone]  MD Reviewed with patient:    Pt was informed of common side effects of psychiatric medications prescribed. As well, patient is directed to read information accompanying their medication (ie,package Insert) and instructed to contact Psyc MD If any Aquestions.    Patient is instructed to report side effects or any other problems to the psychiatrist during clinic business hours. For any acute or urgent issues:  Call 911 or go to nearest emergency department.    Please follow up with your  primary care provider and gen medical specialists for continued monitoring of general health and wellness and any medical conditions.    IF you do not have a [facility] care provider THEN Please establish with a Primary care provider You may visit [email or web address] ([employer] OR contact your insurer for list of providers.     It is the responsibility of the patient to schedule and / or confirm  follow up apptointment  and to reschedule an appointment if an appointment has been canceled or missed.    You should ALWAYS schedule a Follow up Appt UNLESS such Mutually agreed.    Repeated No Shows to appointments or Late Cancellations MAY result in discharge from Clinic    To schedule or confirm your  Follow Up Psychiatry  appointment (or rescheduling):    Check your My Chart Judith     OR   Stop by [facility]       OR  Go Online  to [email or web address]      OR   Call  [facility]:  [phone] to assit     IF you do not see Follow UP Appointment appear on  [facility] THEN such means that you do NOT have a follow up appt scheduled. Call Psychiatry Dept to assit:  [phone]    Understanding was expressed; and no further concerns or questions were raised at this time.     References:     Relaxation stress reduction workbook: LUCY Marroquin PhD ( used: $7-10)    Feeling Good Website: Herve Wayne MD / www.[email or web address].com website (free) / raudel. PODCASTS    Anxiety &  phobia workbook by ORLY Devries PhD  (web retailers: used: $ 7-10)    [place]: Path to Better Sleep : https://www.veterantraining.[place].gov/insomnia/ (free)     Pt expressed appreciation for the visit today and did not have further question at this time though pt  was still informed to:     Call  if problems.    Call / Report Side Effects to Psyc MD     Encouraged to follow up with primary care / Gen Med MD for continued monitoring of general health and wellness.    Understanding was expressed; and no further concerns nor questions were raised at this time.     Remember healthy self care:   eat right  attempt adequate rest   HANDWASHING / encourage such raudel. During this corona virus time   walk or light exercise within reason and as your general med team approves  read or explore any of reference materials / homework mentioned  reach out (I.e.,  connect with)  others who nuture and bring out best in you  avoid risky behaviors    Keep your appointments:    IF you  cannot make your appt THEN please call [phone]  or go online (via My Chart zuleika) to reschedule.    It is the responsibility of the patient to reschedule an appointment if an appointment has been canceled or missed.    Avoid  alcohol and illicit substances.  Look for the positive.  All is often relative-seek balance  Call sooner if needed : [phone]  Call 911 or go to Emergency Room  (ER)  if  any acute concerns

## 2025-03-27 NOTE — PATIENT INSTRUCTIONS
PLAN:     Follow Up  May 6 2025 1 pm IN CLINIC     Follow up as doing counselor [facility]    Psyc Meds: mutually agree to add Lexapro 10 mg daily /HALF tab by mouth in morning x 14 days then 1 FULL tab by mouth in mornng thereafter    Records: says he will give fax number to his counselor  / [facility] Mr Gerhard Zaragoza and ask to fax to [facility] [phone]  MD Reviewed with patient:    Pt was informed of common side effects of psychiatric medications prescribed. As well, patient is directed to read information accompanying their medication (ie,package Insert) and instructed to contact Psyc MD If any Aquestions.    Patient is instructed to report side effects or any other problems to the psychiatrist during clinic business hours. For any acute or urgent issues:  Call 911 or go to nearest emergency department.    Please follow up with your  primary care provider and gen medical specialists for continued monitoring of general health and wellness and any medical conditions.    IF you [facility] not have [facility] provider THEN Please establish with a Primary care provider You may visit [email or web address] OR contact your insurer for list of providers.     It is the responsibility of the patient to schedule and / or confirm  follow up apptointment  and to reschedule an appointment if an appointment has been canceled or missed.    You should ALWAYS schedule a Follow up Appt UNLESS such Mutually agreed.    Repeated No Shows to appointments or Late Cancellations MAY result in discharge from Clinic    To schedule or confirm your  Follow Up Psychiatry  appointment (or rescheduling):    Check your My Chart [facility]     OR   Stop by [facility]       OR  Go Online  to [email or web address]      OR   Call  [facility] Dept:  [phone] to assit     IF you do not see Follow UP Appointment appear on  My [facility] THEN such means that you do NOT have a follow up appt scheduled. Call Psychiatry Dept to assit:  [phone]    Understanding was expressed; and no further concerns or questions were raised at this time.     References:     Relaxation stress reduction workbook: LUCY Marroquin PhD ( used: $7-10)    Feeling Good Website: Herve Wayne MD / [email or web address] website (free) / raudel. PODCASTS    Anxiety &  phobia workbook by ORLY Devries PhD  (web retailers: used: $ 7-10)    [place]: Path to Better Sleep : https://www.veterantraining.[place]gov[place]insomnia/ (free)     Pt expressed appreciation for the visit today and did not have further question at this time though pt  was still informed to:     Call  if problems.    Call / Report Side Effects to Psyc MD     Encouraged to follow up with primary care / Gen Med MD for continued monitoring of general health and wellness.    Understanding was expressed; and no further concerns nor questions were raised at this time.     Remember healthy self care:   eat right  attempt adequate rest   HANDWASHING / encourage such raudel. During this corona virus time   walk or light exercise within reason and as your general med team approves  read or explore any of reference materials / homework mentioned  reach out (I.e.,  connect with)  others who nuture and bring out best in you  avoid risky behaviors    Keep your appointments:    IF you  cannot make your appt THEN please call [phone]  or go online (via My Chart [facility]) to reschedule.    It is the responsibility of the patient to reschedule an appointment if an appointment has been canceled or missed.    Avoid  alcohol and illicit substances.  Look for the positive.  All is often relative-seek balance  Call sooner if needed : [phone]  Call 911 or go to Emergency Room  (ER)  if  any acute concerns

## 2025-03-29 PROBLEM — F12.21 CANNABIS USE DISORDER, SEVERE, IN EARLY REMISSION: Status: ACTIVE | Noted: 2025-03-29

## 2025-03-29 PROBLEM — Z72.0 CURRENT EVERY DAY NICOTINE VAPING: Status: ACTIVE | Noted: 2025-03-29

## 2025-05-06 ENCOUNTER — OFFICE VISIT (OUTPATIENT)
Dept: PSYCHIATRY | Facility: CLINIC | Age: 35
End: 2025-05-06
Payer: COMMERCIAL

## 2025-05-06 VITALS
WEIGHT: 145.75 LBS | HEART RATE: 102 BPM | BODY MASS INDEX: 20.91 KG/M2 | SYSTOLIC BLOOD PRESSURE: 113 MMHG | DIASTOLIC BLOOD PRESSURE: 76 MMHG

## 2025-05-06 DIAGNOSIS — F12.21 CANNABIS USE DISORDER, SEVERE, IN EARLY REMISSION: ICD-10-CM

## 2025-05-06 DIAGNOSIS — F90.2 ADHD (ATTENTION DEFICIT HYPERACTIVITY DISORDER), COMBINED TYPE: ICD-10-CM

## 2025-05-06 DIAGNOSIS — F33.0 MILD EPISODE OF RECURRENT MAJOR DEPRESSIVE DISORDER: Primary | ICD-10-CM

## 2025-05-06 DIAGNOSIS — F41.9 ANXIETY DISORDER, UNSPECIFIED TYPE: ICD-10-CM

## 2025-05-06 DIAGNOSIS — Z72.0 CURRENT EVERY DAY NICOTINE VAPING: ICD-10-CM

## 2025-05-06 DIAGNOSIS — F84.0 HIGH-FUNCTIONING AUTISM SPECTRUM DISORDER: ICD-10-CM

## 2025-05-06 PROBLEM — R41.840 IMPAIRED CONCENTRATION: Status: RESOLVED | Noted: 2025-03-26 | Resolved: 2025-05-06

## 2025-05-06 PROBLEM — F32.A DEPRESSION: Status: RESOLVED | Noted: 2025-02-06 | Resolved: 2025-05-06

## 2025-05-06 PROBLEM — R68.89 SUSPECTED AUTISM DISORDER: Status: RESOLVED | Noted: 2025-03-26 | Resolved: 2025-05-06

## 2025-05-06 PROCEDURE — 99215 OFFICE O/P EST HI 40 MIN: CPT | Mod: S$GLB,,, | Performed by: PSYCHIATRY & NEUROLOGY

## 2025-05-06 PROCEDURE — 3074F SYST BP LT 130 MM HG: CPT | Mod: CPTII,S$GLB,, | Performed by: PSYCHIATRY & NEUROLOGY

## 2025-05-06 PROCEDURE — 99999 PR PBB SHADOW E&M-EST. PATIENT-LVL II: CPT | Mod: PBBFAC,,, | Performed by: PSYCHIATRY & NEUROLOGY

## 2025-05-06 PROCEDURE — 3078F DIAST BP <80 MM HG: CPT | Mod: CPTII,S$GLB,, | Performed by: PSYCHIATRY & NEUROLOGY

## 2025-05-06 PROCEDURE — 3008F BODY MASS INDEX DOCD: CPT | Mod: CPTII,S$GLB,, | Performed by: PSYCHIATRY & NEUROLOGY

## 2025-05-06 PROCEDURE — 1159F MED LIST DOCD IN RCRD: CPT | Mod: CPTII,S$GLB,, | Performed by: PSYCHIATRY & NEUROLOGY

## 2025-05-06 PROCEDURE — 1160F RVW MEDS BY RX/DR IN RCRD: CPT | Mod: CPTII,S$GLB,, | Performed by: PSYCHIATRY & NEUROLOGY

## 2025-05-06 RX ORDER — BUPROPION HYDROCHLORIDE 150 MG/1
150 TABLET ORAL DAILY
Qty: 30 TABLET | Refills: 2 | Status: SHIPPED | OUTPATIENT
Start: 2025-05-06 | End: 2026-05-06

## 2025-05-06 NOTE — PATIENT INSTRUCTIONS
PLAN:   June 18 2025 @ 4:30p Telehealth    Psyc Meds: D/C  Lexapro     Begin Wellbutrin  mg daily    Plan  look at stimulant for ADHD AFTER get mood med in place that works for him     Follow up as doing [facility] ; pt  says MD can call and collab with [facility] if needed    MD Reviewed with patient:    Pt was informed of common side effects of psychiatric medications prescribed. As well, patient is directed to read information accompanying their medication (ie,package Insert) and instructed to contact Psyc MD If any questions.    Patient is instructed to report side effects or any other problems to the psychiatrist during clinic business hours. For any acute or urgent issues:  Call 911 or go to nearest emergency department.    Please follow up with your  primary care provider and gen medical specialists for continued monitoring of general health and wellness and any medical conditions.    IF [facility] not [facility] THEN Please establish with a Primary care provider You may visit [email or web address] OR contact your insurer for list of providers.     It is the responsibility of the patient to schedule and / or confirm  follow up apptointment  and to reschedule an appointment if an appointment has been canceled or missed.    You should ALWAYS schedule a Follow up Appt UNLESS such Mutually agreed.    Repeated No Shows to appointments or Late Cancellations MAY result in discharge from Clinic    To schedule or confirm your  Follow Up Psychiatry  appointment (or rescheduling):    Check your My Chart Judith     OR   Stop by [facility]       OR  Go Online  to [email or web address]      OR   Call  [facility]:  [phone] to assit     IF you do not see Follow UP Appointment appear on  [facility] THEN such means that you do NOT have a follow up appt scheduled. Call Psychiatry Dept to assit: [phone]    Understanding was expressed; and no further concerns or questions  were raised at this time.     References:     Relaxation stress reduction workbook: LUCY Marroquin PhD ( used: $7-10)    Feeling Good Website: Herve Wayne MD / [email or web address] website (free) / raudel. PODCASTS    Anxiety &  phobia workbook by ORLY Devries PhD  (web retailers: used: $ 7-10)    [place]: Path to Better Sleep : https://www.veterantraining.[place]insomnia/ (free)     Pt expressed appreciation for the visit today and did not have further question at this time though pt  was still informed to:     Call  if problems.    Call / Report Side Effects to Psyc MD     Encouraged to follow up with primary care / Gen Med MD for continued monitoring of general health and wellness.    Understanding was expressed; and no further concerns nor questions were raised at this time.     Remember healthy self care:   eat right  attempt adequate rest   HANDWASHING / encourage such raudel. During this corona virus time   walk or light exercise within reason and as your general med team approves  read or explore any of reference materials / homework mentioned  reach out (I.e.,  connect with)  others who nuture and bring out best in you  avoid risky behaviors    Keep your appointments:    IF you  cannot make your appt THEN please call [phone]  or go online (via My Chart zuleika) to reschedule.    It is the responsibility of the patient to reschedule an appointment if an appointment has been canceled or missed.    Avoid  alcohol and illicit substances.  Look for the positive.  All is often relative-seek balance  Call sooner if needed : [phone]  Call 911 or go to Emergency Room  (ER)  if  any acute concerns

## 2025-05-06 NOTE — PROGRESS NOTES
Joey Ordaz   1990 05/06/2025     Disclaimer: Evaluation and treatment is based on information presented to date. Any new information may affect assessment and findings.     NOTE: This note may have been partly created  via use of dictation software which may result in rare (though occasional) transcription errors. If questions, contact this MD via [facility]: [phone]    NOTE: The addition of COLOR FONT and / or FORMATTING (such as use of  Underline or Italics)  may have lj added by GEM Gardiner MD.     The patient location is: IN CLINIC     May 6-2025:  1st follow-up after initial eval March 2025    S: Patient's Own Perception of Condition (& Side Effects) :  Cites some erectile issues with Lexapro which was started as trial his initial visit; as such Lexapro was stopped added as allergy intolerance and discussion held about Wellbutrin XL risks benefits discussed mutually agreed to start Wellbutrin  mg daily     O:      CURRENT PRESENTATION:      Did seem a little more relaxed did say the Lexapro helps some with depression but did cite erectile dysfunction asked about alternatives     As such MD discussed Wellbutrin XL mutually agreed to start at Wellbutrin  mg    I did ask about his counseling that he has been doing at [employer]; counselor did forward a note which will be scanned to the chart; will a counselor was noting some evidence for executive function issues in floated idea of ADHD which we have discussed herself I have a previous [employer] on the chart; his counselor oil also did the same [employer] scream seemed also to reflects some ADHD    Nonetheless his MD did discuss with him path of medication management; it was agreed to 1st go forward with the Wellbutrin to help with some mild depression; and after we see status with that can subsequently look at adding low-dose stimulant     He does state that he is largely off marijuana just has had a  "few gummies over the past 3-4 weeks historically he was using daily basis says he stopped that some months ago have discussed with him potential for urine screens he has expressed understanding    Overall seems a bit more confident less anxious my initial meeting with him he himself says has moved in a better direction did find that the Lexapro helps put a floor or steady his mood yet the sexual dysfunction was an issue for him    Self Ratings:     Constitutional Health Concerns:  No acute concerns did mentioned some sexual dysfunction with the Lexapro which was stopped       Vitals:    05/06/25 1311   BP: 113/76   Pulse: 102        Wt Readings from Last 3 Encounters:   05/06/25 66.1 kg (145 lb 11.6 oz)   03/26/25 65.7 kg (144 lb 13.5 oz)   02/06/25 64.5 kg (142 lb 3.2 oz)   ]     Laboratory Data  No visits with results within 1 Month(s) from this visit.   Latest known visit with results is:   Office Visit on 12/31/2020   Component Date Value Ref Range Status    HSV PCR Source 12/31/2020 SKIN   Corrected    HSV1, PCR 12/31/2020 Positive (A)  Negative Final    HSV2, PCR 12/31/2020 Negative  Negative Final      Mental Status Exam:   Appearance: casual   Oriented: x 3   Attitude: cooperative engaging pleasant   Eye Contact: good   Behavior: calm   Mood: says some anxiety depression  Cognition: aler  Concentration:  cites some distractibility  Affect: appropriate range   Anxiety: mild / moderate  Thought Process: goal directed   Speech: Volume : WNL   Quantity WNL   Quality: appears to openly answer questions   Eye Contact: good   Insight: recognizes struggling with some self care / chores; HE says "people tell him" may have SOME Autism qualites and or ADHD   Threats: no SI / no HI   Psychosis: denies all   Estimate of Intellectual Function: upper average   Impulse Control: no history SI / nor HI ; calm here    Musculoskeletal: no tremor     Social:  Says soon going to attend online school for computer science; likes " chess club      Patient Active Problem List    Diagnosis Date Noted    ADHD (attention deficit hyperactivity disorder), combined type 05/06/2025    Cannabis use disorder, severe, in early remission ; he states: 14yo to 32yo ; (stopped cannbis early 2025  ) DAILY 3 TO 4 OR 5 A DAY// Mixture tobacco and cannabis / // affected grades  no psychosis 03/29/2025    Current every day NICOTINE VAPING / uses frequently thru day //  since Jan 2025 when stopped daily Cannabis 03/29/2025    Anxiety disorder 03/26/2025    High-functioning autism spectrum disorder (mild) elements challenges social cues, restricted interestss 03/26/2025    Chronic midline low back pain without sciatica 02/06/2025        Current Medications[1]     Tobacco Use History[2]     Review of patient's allergies indicates:   Allergen Reactions    Lexapro [escitalopram] Other (See Comments)     Erectile Dysfunction    Penicillins Other (See Comments)     Pt was told and is unaware of reactions         ASSESSMENT:   Encounter Diagnoses   Name Primary?    Mild episode of recurrent major depressive disorder Yes    Anxiety disorder, unspecified type, mild     High-functioning AUTISM SPECTRUM disorder (mild) elements challenges social cues, restricted interestss     ADHD (attention deficit hyperactivity disorder), combined type     Current every day NICOTINE VAPING / uses frequently thru day //  since Jan 2025 when stopped daily Cannabis     Cannabis use in EARLY REMISSION; 14yo to 32yo ; stopped early 2025; Was DAILY 3 TO 5 A DAY// hje was mixing tobacco n cannabis /  affected grades  no psychosis; MAY 2025:just rare gummy      >>  References:     Relaxation stress reduction workbook: LUCY Marroquin PhD ( used: $7-10)    Feeling Good Website: Herve Wayne MD / [email or web address] website (free) / raudel. PODCASTS    Anxiety &  phobia workbook by ORLY Devries PhD  (web retailers: used: $ 7-10)    [place]: Path to Better Sleep : https://www.veterantraining.[place].gov/insomnia/  (free)    [place] Quit with Us [place]: http://www.quitwithusla.org/    (and other resources as per counselor, if applicable)     Pt expressed appreciation for the visit today and did not have further question at this time though pt  was still informed to:     Call / Report Side Effects to Psyc MD     Encouraged to follow up with primary care / Gen Med MD for continued monitoring of general health and wellness.    Understanding was expressed; and no further concerns nor questions were raised at this time.     remember healthy self care:   eat right  attempt adequate rest   HANDWASHING / encourage such raudel. During this corona virus time   walk or light exercise within reason and as your general med team approves  read or explore any of reference materials / homework mentioned  reach out (I.e.,  connect with)  others who nuture and bring out best in you  avoid risky behaviors  keep your appointments  IF you  cannot make your appt THEN please call or go online to reschedule.  avoid  alcohol and illicit substances.  Look for the positive.  All is often relative-seek balance  Call [facility]  if questions : [phone]  Call 911 or go to Emergency Room  (ER)  if any acute concerns  Telehealth Session Info    The patient location is: Patient's  .  Patient reported that his/her location at the time of this visit was in the [facility]     Participants on call:    I also informed patient of the following:     Herve Gardiner MD , an Employee of [facility] / [place] /   [facility]  / [facility]    Each patient to whom he or she provides medical services by telemedicine is: (1) informed of the relationship between the physician and patient and the respective role of any other health care provider with respect to management of the patient; and (2) notified that he or she may decline to receive medical services by telemedicine and may withdraw from such care at any time.    If technology issues arise during call,   pt informed that MD will attempt to call pt back / as well:  pt may call office phone: [phone] in attempt to reconnect.    If pt has questions related to privacy practices or records: pt instructed to contact [facility] Information Department:     Pt informed that IF acute concerns to: Dial 911 or go to nearest Emergency Room (ER)    Understanding Expressed      Joey Ordaz   1990 05/06/2025       Disclaimer: Evaluation and treatment is based on information presented to date. Any new information may affect assessment and findings.     NOTE: This note may have been partly created  via use of dictation software which may result in rare (though occasional) transcription errors. If questions, contact this MD via [facility]: [phone]    NOTE: The addition of COLOR FONT and / or FORMATTING (such as use of  Underline or Italics)  may have lj added by D Zuleyka MD.       S: Patient's Own Perception of Condition (& Side Effects) :      O:      CURRENT PRESENTATION:      General Observations:       Self Ratings:       Comments Medication:         Constitutional Health Concerns:      .ros  ROS@    Vitals:    05/06/25 1311   BP: 113/76   Pulse: 102        Wt Readings from Last 3 Encounters:   05/06/25 66.1 kg (145 lb 11.6 oz)   03/26/25 65.7 kg (144 lb 13.5 oz)   02/06/25 64.5 kg (142 lb 3.2 oz)   ]     Laboratory Data  No visits with results within 1 Month(s) from this visit.   Latest known visit with results is:   Office Visit on 12/31/2020   Component Date Value Ref Range Status    HSV PCR Source 12/31/2020 SKIN   Corrected    HSV1, PCR 12/31/2020 Positive (A)  Negative Final    HSV2, PCR 12/31/2020 Negative  Negative Final        Mental Status Exam:      Appearance: casual   Oriented: x 3   Attitude: cooperative   Eye Contact: good  Behavior: calm     Mood: says feeling better  Cognition: alert  Concentration: grossly intact   Affect: appropriate range      Anxiety: mild     Thought Process:  goal directed     Speech:       Volume : WNL       Quantity WNL       Quality: appears to openly answer questions      Threats: no SI / no HI     Psychosis: denies all      Estimate of Intellectual Function: average   Impulse Control: no thoughts of harm to self/ others      Musculoskeletal:      Pain Level:      Social: Living Situation / Supports / Activities / Substance      Patient Active Problem List    Diagnosis Date Noted    ADHD (attention deficit hyperactivity disorder), combined type 05/06/2025    Cannabis use disorder, severe, in early remission ; he states: 14yo to 34yo ; (stopped cannbis early 2025  ) DAILY 3 TO 4 OR 5 A DAY// Mixture tobacco and cannabis / // affected grades  no psychosis 03/29/2025    Current every day NICOTINE VAPING / uses frequently thru day //  since Jan 2025 when stopped daily Cannabis 03/29/2025    Anxiety disorder 03/26/2025    High-functioning autism spectrum disorder (mild) elements challenges social cues, restricted interestss 03/26/2025    Chronic midline low back pain without sciatica 02/06/2025        Current Medications[3]     Tobacco Use History[4]     Review of patient's allergies indicates:   Allergen Reactions    Lexapro [escitalopram] Other (See Comments)     Erectile Dysfunction    Penicillins Other (See Comments)     Pt was told and is unaware of reactions         ASSESSMENT:   Encounter Diagnoses   Name Primary?    Mild episode of recurrent major depressive disorder Yes    Anxiety disorder, unspecified type, mild     High-functioning AUTISM SPECTRUM disorder (mild) elements challenges social cues, restricted interestss     ADHD (attention deficit hyperactivity disorder), combined type     Current every day NICOTINE VAPING / uses frequently thru day //  since Jan 2025 when stopped daily Cannabis     Cannabis use in EARLY REMISSION; 14yo to 34yo ; stopped early 2025; Was DAILY 3 TO 5 A DAY// hje was mixing tobacco n cannabis /  affected grades  no psychosis; MAY  2025:just rare gummy      Patient Instructions     PLAN:   June 18 2025 @ 4:30p Telehealth    Psyc Meds: D/C  Lexapro     Begin Wellbutrin  mg daily    Plan  look at stimulant for ADHD AFTER get mood med in place that works for him     Follow up as doing [facility] ; pt  says MD can call and collab with [facility] if needed    MD Reviewed with patient:    Pt was informed of common side effects of psychiatric medications prescribed. As well, patient is directed to read information accompanying their medication (ie,package Insert) and instructed to contact Psyc MD If any questions.    Patient is instructed to report side effects or any other problems to the psychiatrist during clinic business hours. For any acute or urgent issues:  Call 911 or go to nearest emergency department.    Please follow up with your  primary care provider and gen medical specialists for continued monitoring of general health and wellness and any medical conditions.    IF [facility] THEN Please establish with a Primary care provider You may visit [email or web address] OR contact your insurer for list of providers.     It is the responsibility of the patient to schedule and / or confirm  follow up apptointment  and to reschedule an appointment if an appointment has been canceled or missed.    You should ALWAYS schedule a Follow up Appt UNLESS such Mutually agreed.    Repeated No Shows to appointments or Late Cancellations MAY result in discharge from Clinic    To schedule or confirm your  Follow Up Psychiatry  appointment (or rescheduling):    Check your My Chart Judith     OR   Stop by [facility]       OR  Go Online  to [email or web address]      OR   Call  [facility]:  [phone] to assit     IF you do not see Follow UP Appointment appear on  My [facility] THEN such means that you do NOT have a follow up appt scheduled. Call Psychiatry Dept to assit: [phone]    Understanding was  expressed; and no further concerns or questions were raised at this time.     References:     Relaxation stress reduction workbook: LUCY Marroquin PhD ( used: $7-10)    Feeling Good Website: Herve Wayne MD / [email or web address] website (free) / raudel. PODCASTS    Anxiety &  phobia workbook by ORLY Devries PhD  (web retailers: used: $ 7-10)    [place]: Path to Better Sleep : https://www.veterantraining.[place].gov/insomnia/ (free)     Pt expressed appreciation for the visit today and did not have further question at this time though pt  was still informed to:     Call  if problems.    Call / Report Side Effects to Psyc MD     Encouraged to follow up with primary care / Gen Med MD for continued monitoring of general health and wellness.    Understanding was expressed; and no further concerns nor questions were raised at this time.     Remember healthy self care:   eat right  attempt adequate rest   HANDWASHING / encourage such raudel. During this corona virus time   walk or light exercise within reason and as your general med team approves  read or explore any of reference materials / homework mentioned  reach out (I.e.,  connect with)  others who nuture and bring out best in you  avoid risky behaviors    Keep your appointments:    IF you  cannot make your appt THEN please call [phone]  or go online (via My Chart [facility]ika) to reschedule.    It is the responsibility of the patient to reschedule an appointment if an appointment has been canceled or missed.    Avoid  alcohol and illicit substances.  Look for the positive.  All is often relative-seek balance  Call sooner if needed : [phone]  Call 911 or go to Emergency Room  (ER)  if  any acute concerns           [1]   Current Outpatient Medications:     buPROPion (WELLBUTRIN XL) 150 MG TB24 tablet, Take 1 tablet (150 mg total) by mouth once daily., Disp: 30 tablet, Rfl: 2    hepatitis A and B vaccine, PF, (TWINRIX) 720 MURIEL unit- 20 mcg/mL Syrg suspension, 3 total doses: today  (3/20/25), in one month, then in 6 months, Disp: 1 mL, Rfl: 0  [2]   Social History  Tobacco Use   Smoking Status Every Day    Current packs/day: 0.00    Average packs/day: 0.3 packs/day for 16.7 years (5.0 ttl pk-yrs)    Types: Cigarettes, Vaping with nicotine    Start date:     Last attempt to quit: 10/2024    Years since quittin.5   Smokeless Tobacco Never   [3]   Current Outpatient Medications:     buPROPion (WELLBUTRIN XL) 150 MG TB24 tablet, Take 1 tablet (150 mg total) by mouth once daily., Disp: 30 tablet, Rfl: 2    hepatitis A and B vaccine, PF, (TWINRIX) 720 MURIEL unit- 20 mcg/mL Syrg suspension, 3 total doses: today (3/20/25), in one month, then in 6 months, Disp: 1 mL, Rfl: 0  [4]   Social History  Tobacco Use   Smoking Status Every Day    Current packs/day: 0.00    Average packs/day: 0.3 packs/day for 16.7 years (5.0 ttl pk-yrs)    Types: Cigarettes, Vaping with nicotine    Start date:     Last attempt to quit: 10/2024    Years since quittin.5   Smokeless Tobacco Never

## 2025-06-18 ENCOUNTER — OFFICE VISIT (OUTPATIENT)
Dept: PSYCHIATRY | Facility: CLINIC | Age: 35
End: 2025-06-18
Payer: COMMERCIAL

## 2025-06-18 DIAGNOSIS — F84.0 HIGH-FUNCTIONING AUTISM SPECTRUM DISORDER: Primary | ICD-10-CM

## 2025-06-18 DIAGNOSIS — Z72.0 CURRENT EVERY DAY NICOTINE VAPING: ICD-10-CM

## 2025-06-18 DIAGNOSIS — F41.9 ANXIETY DISORDER, UNSPECIFIED TYPE: ICD-10-CM

## 2025-06-18 DIAGNOSIS — F33.0 MILD EPISODE OF RECURRENT MAJOR DEPRESSIVE DISORDER: ICD-10-CM

## 2025-06-18 DIAGNOSIS — F90.2 ADHD (ATTENTION DEFICIT HYPERACTIVITY DISORDER), COMBINED TYPE: ICD-10-CM

## 2025-06-18 DIAGNOSIS — F12.21 CANNABIS USE DISORDER, SEVERE, IN EARLY REMISSION: ICD-10-CM

## 2025-06-18 PROCEDURE — 1160F RVW MEDS BY RX/DR IN RCRD: CPT | Mod: CPTII,95,, | Performed by: PSYCHIATRY & NEUROLOGY

## 2025-06-18 PROCEDURE — 1159F MED LIST DOCD IN RCRD: CPT | Mod: CPTII,95,, | Performed by: PSYCHIATRY & NEUROLOGY

## 2025-06-18 PROCEDURE — 98007 SYNCH AUDIO-VIDEO EST HI 40: CPT | Mod: 95,,, | Performed by: PSYCHIATRY & NEUROLOGY

## 2025-06-18 RX ORDER — BUPROPION HYDROCHLORIDE 150 MG/1
150 TABLET ORAL DAILY
Qty: 30 TABLET | Refills: 2 | Status: SHIPPED | OUTPATIENT
Start: 2025-06-18 | End: 2025-09-16

## 2025-06-18 RX ORDER — ARIPIPRAZOLE 2 MG/1
2 TABLET ORAL DAILY
Qty: 30 TABLET | Refills: 2 | Status: SHIPPED | OUTPATIENT
Start: 2025-06-18 | End: 2025-09-16

## 2025-06-18 NOTE — PROGRESS NOTES
Joeylinus Ordaz   1990 06/22/2025     Disclaimer: Evaluation and treatment is based on information presented to date. Any new information may affect assessment and findings.     NOTE: This note may have been partly created  via use of dictation software which may result in rare (though occasional) transcription errors. If questions, contact this MD via [facility]: [phone]    NOTE: The addition of COLOR FONT and / or FORMATTING (such as use of  Underline or Italics)  may have lj added by GEM Gardiner MD.    The patient location is: Patient's his apt [facility]  (June 18 2025)     Visit type: Virtual visit with synchronous audio and video     Each patient to whom he or she provides medical services by telemedicine is: (1) informed of the relationship between the physician and patient and the respective role of any other health care provider with respect to management of the patient; and (2) notified that he or she may decline to receive medical services by telemedicine and may withdraw from such care at any time.    Patient was informed that I am a physician who is licensed in the [facility]:  Herve Gardiner MD:  Employed by   [employer]     If technology issues arise: GEM Gardiner MD will attempt to call pt back     Pt informed that if he / she is ever in crisis (or has acute concerns): pt is instructed to Dial 911 or go to nearest Emergency Room (ER)    Pt informed that if questions related to privacy practices: pt is instructed to contact [facility]:     Understanding Expressed. No questions.     S: Patient's Own Perception of Condition (& Side Effects) :     nd discussion held about Wellbutrin XL risks benefits discussed mutually agreed to start Wellbutrin  mg daily     O:      CURRENT PRESENTATION:     Years ago tobacco and cannabis x years / moved to vaping as substitute to combat heightened anxiety    Says been working doing  [employer] and  "AUDIO VISUAL (setting up equipment) and go back school (online [employer]): COMPUTER SCIENCE ; FLEXIBLE SCHEDULING     At May 2025 session: Lexapro was replaced to address depression as cited erectile dysfunction ; mutually agreed to move to  Wellbutrin XL mutually agreed to start at Wellbutrin  mg    New counselor at [facility] named : Kalee / as prior counselor completed his trainnig program / ([facility] counselors are supervised )    Prior counselor was noting some evidence for executive function issues in floated idea of ADHD which we have discussed herself I have a previous World Health Organization on the chart; his counselor also did the same World Health Organization screen    'seems' also to reflects some ADHD / tho to better address / still need to get better mood and anxiety BEFORE ADHD more center stage    Nonetheless his MD did discuss with him path of medication management; it was agreed to 1st go forward with the Wellbutrin to help with some mild depression; also hope to further address anxiety issues and AFTER Such  we  can subsequently look at ADHD issues / re further assessment or stimulants tho depends on response to treatment depression/ anxiety / and also optimally backing off frequent vaping of nicotine    He does state that he is largely off marijuana just has had a few gummies over the past 3-4 weeks historically he was using DAILY basis says he stopped that some months ago early 2025 ; have discussed with him potential for urine screens, raudel if treating ADHD /  he expresses understanding    Overall seems a bit more confident less anxious my initial meeting with him     he himself says has moved in a better direction did find that the Lexapro helped "put a floor to steady his mood" yet the sexual dysfunction was an issue for him    More productive   Doing more chores / tasks  Doing Better with career     Without vaping nicotine says feels more anxiety / irritable and claustrophobic " "    HE says a "key for him is that  IF someone else tells him to do something" / EASY to do it    Yet IF it's just ME // or no feedback / he tends to drift off     Says would work on project API on [employer] back end of zuleika / underlying layer that can     Self Ratings:         6/18/2025     5:02 PM 3/26/2025     8:01 AM   PHILL-7   Was test performed? Yes    1. Feeling nervous, anxious, or on edge? More than half the days Several days   2. Not being able to stop or control worrying? Several days Several days   3. Worrying too much about different things? More than half the days Several days   4. Trouble relaxing? Not at all Several days   5. Being so restless that it is hard to sit still? Nearly everyday More than half the days   6. Becoming easily annoyed or irritable? Several days Several days   7. Feeling afraid as if something awful might happen? Several days Not at all   8. If you checked off any problems, how difficult have these problems made it for you to do your work, take care of things at home, or get along with other people? Somewhat difficult Somewhat difficult   PHILL-7 Score 10 7    Number answered (out of first 7) 7 7    Interpretation Moderate Anxiety Mild Anxiety        Patient-reported          6/18/2025     5:12 PM   Depression Patient Health Questionnaire   Over the last two weeks how often have you been bothered by little interest or pleasure in doing things Not at all   Over the last two weeks how often have you been bothered by feeling down, depressed or hopeless More than half the days   PHQ-2 Total Score 2   Over the last two weeks how often have you been bothered by trouble falling or staying asleep, or sleeping too much Nearly every day   Over the last two weeks how often have you been bothered by feeling tired or having little energy More than half the days   Over the last two weeks how often have you been bothered by a poor appetite or overeating Not at all   Over the last two weeks how often " "have you been bothered by feeling bad about yourself - or that you are a failure or have let yourself or your family down Nearly every day   Over the last two weeks how often have you been bothered by trouble concentrating on things, such as reading the newspaper or watching television More than half the days   Over the last two weeks how often have you been bothered by moving or speaking so slowly that other people could have noticed. Or the opposite - being so fidgety or restless that you have been moving around a lot more than usual. More than half the days   Over the last two weeks how often have you been bothered by thoughts that you would be better off dead, or of hurting yourself Not at all   If you checked off any problems, how difficult have these problems made it for you to do your work, take care of things at home or get along with other people? Somewhat difficult   PHQ-9 Score 14   PHQ-9 Interpretation Moderate       Constitutional Health Concerns:  No acute concerns now that off Lexapro / re sexual dysfunction     There were no vitals filed for this visit. / telehealth session     Last 3 sets of Vitals        2/6/2025     8:27 AM 3/26/2025     9:10 AM 5/6/2025     1:11 PM   Vitals - 1 value per visit   SYSTOLIC 105 117 113   DIASTOLIC 65 77 76   Pulse 68 102 102   Weight (lb) 142.2 144.84 145.72   Weight (kg) 64.5 65.7 66.1   Height 5' 10" (1.778 m)     BMI (Calculated) 20.4           Laboratory Data  No visits with results within 1 Month(s) from this visit.   Latest known visit with results is:   Office Visit on 12/31/2020   Component Date Value Ref Range Status    HSV PCR Source 12/31/2020 SKIN   Corrected    HSV1, PCR 12/31/2020 Positive (A)  Negative Final    HSV2, PCR 12/31/2020 Negative  Negative Final      Mental Status Exam:   Appearance: casual   Oriented: x 3   Attitude: cooperative pleasant   Eye Contact: good   Behavior: calm   Mood: says some anxiety depression  Cognition: " "aler  Concentration:  cites some distractibility  Affect: appropriate range   Anxiety: mild / moderate  Thought Process: goal directed   Speech: Volume : WNL   Quantity WNL   Quality: appears to openly answer questions   Eye Contact: good   Insight: recognizes struggling with some self care / chores; HE says "people tell him" may have SOME Autism qualites and or ADHD   Threats: no SI / no HI   Psychosis: denies all   Estimate of Intellectual Function: upper average   Impulse Control: no history SI / nor HI ; calm here    Musculoskeletal: no tremor     Social:  Says soon going to attend online school for computer science; likes chess club      Patient Active Problem List    Diagnosis Date Noted    ADHD (attention deficit hyperactivity disorder), combined type 05/06/2025    Cannabis use disorder, severe, in early remission ; he states: 14yo to 32yo ; (stopped cannbis early 2025  ) DAILY 3 TO 4 OR 5 A DAY// Mixture tobacco and cannabis / // affected grades  no psychosis 03/29/2025    Current every day NICOTINE VAPING / uses frequently thru day //  since Jan 2025 when stopped daily Cannabis 03/29/2025    Anxiety disorder 03/26/2025    High-functioning autism spectrum disorder (mild) elements challenges social cues, restricted interestss 03/26/2025    Chronic midline low back pain without sciatica 02/06/2025      Current Outpatient Medications   Medication Sig    ARIPiprazole (ABILIFY) 2 MG Tab Take 1 tablet (2 mg total) by mouth once daily.    buPROPion (WELLBUTRIN XL) 150 MG TB24 tablet Take 1 tablet (150 mg total) by mouth once daily.    hepatitis A and B vaccine, PF, (TWINRIX) 720 MURIEL unit- 20 mcg/mL Syrg suspension 3 total doses: today (3/20/25), in one month, then in 6 months     No current facility-administered medications for this visit.        Review of patient's allergies indicates:   Allergen Reactions    Lexapro [escitalopram] Other (See Comments)     Erectile Dysfunction    Penicillins Other (See Comments) "     Pt was told and is unaware of reactions       ASSESSMENT:   Encounter Diagnoses   Name Primary?    High-functioning AUTISM SPECTRUM disorder (mild) elements challenges social cues, restricted interestss Yes    Anxiety disorder, unspecified type, modeerate / internal sense tension irritability     Mild episode of recurrent major depressive disorder     Current every day NICOTINE VAPING / uses frequently thru day //  since Jan 2025 when stopped daily Cannabis     ADHD (attention deficit hyperactivity disorder), combined type     Cannabis use in EARLY REMISSION; 16yo to 34yo ; stopped early 2025; Was DAILY 3 TO 5 A DAY// hje was mixing tobacco n cannabis /  affected grades  no psychosis; MAY 2025:just rare gummy        At today's appt,  MD addressed long term clinical issues (Autism Spectrum Anxiety ) that are of  Increased Complexity. Note  Collateral issues impacting such issue(s) include : daily and frequent Nicotine Vaping ; history cannabis and tobacco x years tho in remission;  . This MD  1)personally  saw patient ;  obtained additional history ; and 2) reviewed notes of collateral providers (where appropriate).  3) MD assessed long term complex condition(s); 4) discussed with pt; and 5) made treatment decisions in concert with pt (including: med mngt and/or psychotherapy).   Patient Instructions     PLAN:     Follow up     Aug 6 2025 @ 1pm TELEHEALTH   AND   Sept 22 2025 @ 3 pm ext appt 60 min IN CLINIC    Psyc Meds:   Wellbutrin  mg daily  [place] agree to add : Abilify 2 mg daily     Will consider add stimulant for ADHD  tho only  AFTER get mood med in place that works for him / also optimally he would further decrease his frequent nicotine vaping     Follow up as doing [facility] ; pt  says MD can call and collab with [facility] if needed    MD Reviewed with patient:    Pt was informed of common side effects of psychiatric medications prescribed. As well, patient is directed to read  information accompanying their medication (ie,package Insert) and instructed to contact Psyc MD If any questions.    Patient is instructed to report side effects or any other problems to the psychiatrist during clinic business hours. For any acute or urgent issues:  Call 911 or go to nearest emergency department.    Please follow up with your  primary care provider and gen medical specialists for continued monitoring of general health and wellness and any medical conditions.    IF you [facility] not have [facility] provider THEN Please establish with a Primary care provider You may visit [email or web address] OR contact your insurer for list of providers.     It is the responsibility of the patient to schedule and / or confirm  follow up apptointment  and to reschedule an appointment if an appointment has been canceled or missed.    You should ALWAYS schedule a Follow up Appt UNLESS such Mutually agreed.    Repeated No Shows to appointments or Late Cancellations MAY result in discharge from Clinic    To schedule or confirm your  Follow Up Psychiatry  appointment (or rescheduling):    Check your My Chart Judith     OR   Stop by [facility]       OR  Go Online  to [email or web address]      OR   Call  [facility]:  [phone] to assit     IF you do not see Follow UP Appointment appear on  [facility] THEN such means that you do NOT have a follow up appt scheduled. Call Psychiatry Dept to assit: [phone]    Understanding was expressed; and no further concerns or questions were raised at this time.     References:     Relaxation stress reduction workbook: LUCY Marroquin PhD ( used: $7-10)    Feeling Good Website: Herve Wayne MD / [email or web address] website (free) / raudel. PODCASTS    Anxiety &  phobia workbook by ORLY Devries PhD  (web retailers: used: $ 7-10)    [place]: Path to Better Sleep : https://www.veterantraining.[place].gov/insomnia/ (free)     Pt expressed appreciation for the visit today and did not have  further question at this time though pt  was still informed to:     Call  if problems.    Call / Report Side Effects to Psyc MD     Encouraged to follow up with primary care / Gen Med MD for continued monitoring of general health and wellness.    Understanding was expressed; and no further concerns nor questions were raised at this time.     Remember healthy self care:   eat right  attempt adequate rest   HANDWASHING / encourage such raudel. During this corona virus time   walk or light exercise within reason and as your general med team approves  read or explore any of reference materials / homework mentioned  reach out (I.e.,  connect with)  others who nuture and bring out best in you  avoid risky behaviors    Keep your appointments:    IF you  cannot make your appt THEN please call [phone]  or go online (via My Chart zuleika) to reschedule.    It is the responsibility of the patient to reschedule an appointment if an appointment has been canceled or missed.    Avoid  alcohol and illicit substances.  Look for the positive.  All is often relative-seek balance  Call sooner if needed : [phone]  Call 911 or go to Emergency Room  (ER)  if  any acute concerns

## 2025-06-22 NOTE — PATIENT INSTRUCTIONS
PLAN:     Follow up     Aug 6 2025 @ 1pm TELEHEALTH   AND   Sept 22 2025 @ 3 pm ext appt 60 min IN CLINIC    Psyc Meds:   Wellbutrin  mg daily  [place] agree to add : Abilify 2 mg daily     Will consider add stimulant for ADHD  tho only  AFTER get mood med in place that works for him / also optimally he would further decrease his frequent nicotine vaping     Follow up as doing [facility] ; pt  says MD can call and collab with [facility] if needed    MD Reviewed with patient:    Pt was informed of common side effects of psychiatric medications prescribed. As well, patient is directed to read information accompanying their medication (ie,package Insert) and instructed to contact Psyc MD If any questions.    Patient is instructed to report side effects or any other problems to the psychiatrist during clinic business hours. For any acute or urgent issues:  Call 911 or go to nearest emergency department.    Please follow up with your  primary care provider and gen medical specialists for continued monitoring of general health and wellness and any medical conditions.    IF you do not have a [facility] care provider THEN Please establish with a Primary care provider You may visit [email or web address] OR contact your insurer for list of providers.     It is the responsibility of the patient to schedule and / or confirm  follow up apptointment  and to reschedule an appointment if an appointment has been canceled or missed.    You should ALWAYS schedule a Follow up Appt UNLESS such Mutually agreed.    Repeated No Shows to appointments or Late Cancellations MAY result in discharge from Clinic    To schedule or confirm your  Follow Up Psychiatry  appointment (or rescheduling):    Check your My Chart Judith     OR   Stop by [facility]       OR  Go Online  to [email or web address]      OR   Call  [facility] Dept:  [phone] to assit     IF you do not see Follow UP Appointment appear on  [facility] THEN  such means that you do NOT have a follow up appt scheduled. Call Psychiatry Dept to assit: [phone]    Understanding was expressed; and no further concerns or questions were raised at this time.     References:     Relaxation stress reduction workbook: LUCY Marroquin PhD ( used: $7-10)    Feeling Good Website: Herve Wayne MD / [email or web address] website (free) / raudel. PODCASTS    Anxiety &  phobia workbook by ORLY Devries PhD  (web retailers: used: $ 7-10)    [place]: Path to Better Sleep : https://www.veterantraining.[place]/insomnia/ (free)     Pt expressed appreciation for the visit today and did not have further question at this time though pt  was still informed to:     Call  if problems.    Call / Report Side Effects to Psyc MD     Encouraged to follow up with primary care / Gen Med MD for continued monitoring of general health and wellness.    Understanding was expressed; and no further concerns nor questions were raised at this time.     Remember healthy self care:   eat right  attempt adequate rest   HANDWASHING / encourage such raudel. During this corona virus time   walk or light exercise within reason and as your general med team approves  read or explore any of reference materials / homework mentioned  reach out (I.e.,  connect with)  others who nuture and bring out best in you  avoid risky behaviors    Keep your appointments:    IF you  cannot make your appt THEN please call [phone]  or go online (via My Chart zuleika) to reschedule.    It is the responsibility of the patient to reschedule an appointment if an appointment has been canceled or missed.    Avoid  alcohol and illicit substances.  Look for the positive.  All is often relative-seek balance  Call sooner if needed : [phone]  Call [phone] or go to Emergency Room  (ER)  if  any acute concerns

## 2025-06-26 ENCOUNTER — PATIENT MESSAGE (OUTPATIENT)
Dept: PSYCHIATRY | Facility: CLINIC | Age: 35
End: 2025-06-26
Payer: COMMERCIAL

## 2025-07-10 ENCOUNTER — TELEPHONE (OUTPATIENT)
Dept: INTERNAL MEDICINE | Facility: CLINIC | Age: 35
End: 2025-07-10
Payer: COMMERCIAL

## 2025-07-10 DIAGNOSIS — F41.9 ANXIETY DISORDER, UNSPECIFIED TYPE: Primary | ICD-10-CM

## 2025-07-10 NOTE — TELEPHONE ENCOUNTER
Called patient to discuss recommendation for routine lab work at the request of his psychiatrist Dr. Gardiner. His most recent labs were 3 years ago so I think it is reasonable to repeat them. Patient is agreeable to this as well.

## 2025-07-14 ENCOUNTER — LAB VISIT (OUTPATIENT)
Dept: LAB | Facility: HOSPITAL | Age: 35
End: 2025-07-14
Payer: COMMERCIAL

## 2025-07-14 DIAGNOSIS — F41.9 ANXIETY DISORDER, UNSPECIFIED TYPE: ICD-10-CM

## 2025-07-14 LAB
ABSOLUTE EOSINOPHIL (OHS): 0.1 K/UL
ABSOLUTE MONOCYTE (OHS): 0.56 K/UL (ref 0.3–1)
ABSOLUTE NEUTROPHIL COUNT (OHS): 4.18 K/UL (ref 1.8–7.7)
ALBUMIN SERPL BCP-MCNC: 4.3 G/DL (ref 3.5–5.2)
ALP SERPL-CCNC: 76 UNIT/L (ref 40–150)
ALT SERPL W/O P-5'-P-CCNC: 25 UNIT/L (ref 10–44)
ANION GAP (OHS): 8 MMOL/L (ref 8–16)
AST SERPL-CCNC: 22 UNIT/L (ref 11–45)
BASOPHILS # BLD AUTO: 0.04 K/UL
BASOPHILS NFR BLD AUTO: 0.6 %
BILIRUB SERPL-MCNC: 0.8 MG/DL (ref 0.1–1)
BUN SERPL-MCNC: 16 MG/DL (ref 6–20)
CALCIUM SERPL-MCNC: 9.3 MG/DL (ref 8.7–10.5)
CHLORIDE SERPL-SCNC: 108 MMOL/L (ref 95–110)
CHOLEST SERPL-MCNC: 166 MG/DL (ref 120–199)
CHOLEST/HDLC SERPL: 3.8 {RATIO} (ref 2–5)
CO2 SERPL-SCNC: 25 MMOL/L (ref 23–29)
CREAT SERPL-MCNC: 0.7 MG/DL (ref 0.5–1.4)
ERYTHROCYTE [DISTWIDTH] IN BLOOD BY AUTOMATED COUNT: 13.2 % (ref 11.5–14.5)
GFR SERPLBLD CREATININE-BSD FMLA CKD-EPI: >60 ML/MIN/1.73/M2
GLUCOSE SERPL-MCNC: 104 MG/DL (ref 70–110)
HCT VFR BLD AUTO: 40.3 % (ref 40–54)
HCV AB SERPL QL IA: NORMAL
HDLC SERPL-MCNC: 44 MG/DL (ref 40–75)
HDLC SERPL: 26.5 % (ref 20–50)
HGB BLD-MCNC: 13.4 GM/DL (ref 14–18)
HIV 1+2 AB+HIV1 P24 AG SERPL QL IA: NORMAL
IMM GRANULOCYTES # BLD AUTO: 0.02 K/UL (ref 0–0.04)
IMM GRANULOCYTES NFR BLD AUTO: 0.3 % (ref 0–0.5)
LDLC SERPL CALC-MCNC: 71.8 MG/DL (ref 63–159)
LYMPHOCYTES # BLD AUTO: 1.69 K/UL (ref 1–4.8)
MCH RBC QN AUTO: 32.6 PG (ref 27–31)
MCHC RBC AUTO-ENTMCNC: 33.3 G/DL (ref 32–36)
MCV RBC AUTO: 98 FL (ref 82–98)
NONHDLC SERPL-MCNC: 122 MG/DL
NUCLEATED RBC (/100WBC) (OHS): 0 /100 WBC
PLATELET # BLD AUTO: 312 K/UL (ref 150–450)
PMV BLD AUTO: 10.7 FL (ref 9.2–12.9)
POTASSIUM SERPL-SCNC: 3.6 MMOL/L (ref 3.5–5.1)
PROT SERPL-MCNC: 7.2 GM/DL (ref 6–8.4)
RBC # BLD AUTO: 4.11 M/UL (ref 4.6–6.2)
RELATIVE EOSINOPHIL (OHS): 1.5 %
RELATIVE LYMPHOCYTE (OHS): 25.6 % (ref 18–48)
RELATIVE MONOCYTE (OHS): 8.5 % (ref 4–15)
RELATIVE NEUTROPHIL (OHS): 63.5 % (ref 38–73)
SODIUM SERPL-SCNC: 141 MMOL/L (ref 136–145)
TRIGL SERPL-MCNC: 251 MG/DL (ref 30–150)
TSH SERPL-ACNC: 1.94 UIU/ML (ref 0.4–4)
WBC # BLD AUTO: 6.59 K/UL (ref 3.9–12.7)

## 2025-07-14 PROCEDURE — 85025 COMPLETE CBC W/AUTO DIFF WBC: CPT

## 2025-07-14 PROCEDURE — 86803 HEPATITIS C AB TEST: CPT

## 2025-07-14 PROCEDURE — 84443 ASSAY THYROID STIM HORMONE: CPT

## 2025-07-14 PROCEDURE — 36415 COLL VENOUS BLD VENIPUNCTURE: CPT | Mod: PO

## 2025-07-14 PROCEDURE — 82465 ASSAY BLD/SERUM CHOLESTEROL: CPT

## 2025-07-14 PROCEDURE — 84460 ALANINE AMINO (ALT) (SGPT): CPT

## 2025-07-14 PROCEDURE — 87389 HIV-1 AG W/HIV-1&-2 AB AG IA: CPT

## 2025-07-15 ENCOUNTER — PATIENT MESSAGE (OUTPATIENT)
Dept: INTERNAL MEDICINE | Facility: CLINIC | Age: 35
End: 2025-07-15
Payer: COMMERCIAL

## 2025-07-15 ENCOUNTER — RESULTS FOLLOW-UP (OUTPATIENT)
Dept: INTERNAL MEDICINE | Facility: CLINIC | Age: 35
End: 2025-07-15
Payer: COMMERCIAL

## 2025-08-03 ENCOUNTER — PATIENT MESSAGE (OUTPATIENT)
Dept: INTERNAL MEDICINE | Facility: CLINIC | Age: 35
End: 2025-08-03
Payer: COMMERCIAL

## 2025-08-06 ENCOUNTER — OFFICE VISIT (OUTPATIENT)
Dept: PSYCHIATRY | Facility: CLINIC | Age: 35
End: 2025-08-06
Payer: COMMERCIAL

## 2025-08-06 ENCOUNTER — PATIENT MESSAGE (OUTPATIENT)
Dept: PSYCHIATRY | Facility: CLINIC | Age: 35
End: 2025-08-06
Payer: COMMERCIAL

## 2025-08-06 DIAGNOSIS — F33.0 MILD EPISODE OF RECURRENT MAJOR DEPRESSIVE DISORDER: Primary | ICD-10-CM

## 2025-08-06 PROCEDURE — 98007 SYNCH AUDIO-VIDEO EST HI 40: CPT | Mod: 95,,, | Performed by: PSYCHIATRY & NEUROLOGY

## 2025-08-06 PROCEDURE — G2211 COMPLEX E/M VISIT ADD ON: HCPCS | Mod: 95,,, | Performed by: PSYCHIATRY & NEUROLOGY

## 2025-08-06 RX ORDER — BUPROPION HYDROCHLORIDE 300 MG/1
300 TABLET ORAL DAILY
Qty: 30 TABLET | Refills: 1 | Status: SHIPPED | OUTPATIENT
Start: 2025-08-06 | End: 2025-10-05

## 2025-08-06 NOTE — PATIENT INSTRUCTIONS
PLAN:     Follow up   Sept 4 2025 @ 3:30p / Telehealth   AND   Sept 22 2025 @ 3 pm  IN CLINIC    Psyc Meds:   8-6-25 :   Advance to Wellbutrin  mg daily  Continue Abilify 2 mg daily     MD did strongly encourage him to consider [facility] or [facility] call [phone] for more info // Such programming teaches  develop skills that may assist him with management of anxiety brochure sent via has my [facility] portal and reviewed such with him  /     Will consider add stimulant for ADHD  tho only  AFTER get mood med in place that works for him / also optimally he would further decrease his frequent nicotine vaping     Follow up as doing [facility] ; pt  says MD can call and [facility] if needed    MD Reviewed with patient:    Pt was informed of common side effects of psychiatric medications prescribed. As well, patient is directed to read information accompanying their medication (ie,package Insert) and instructed to contact Psyc MD If any questions.    Patient is instructed to report side effects or any other problems to the psychiatrist during clinic business hours. For any acute or urgent issues:  Call 911 or go to nearest emergency department.    Please follow up with your  primary care provider and gen medical specialists for continued monitoring of general health and wellness and any medical conditions.    IF you do not have a [facility] care provider THEN Please establish with a Primary care provider You may visit [email or web address] OR contact your insurer for list of providers.     It is the responsibility of the patient to schedule and / or confirm  follow up apptointment  and to reschedule an appointment if an appointment has been canceled or missed.    You should ALWAYS schedule a Follow up Appt UNLESS such Mutually agreed.    Repeated No Shows to appointments or Late Cancellations MAY result in discharge from Clinic    To schedule or confirm your  Follow Up  Psychiatry  appointment (or rescheduling):    Check your My Chart Judith     OR   Stop by [facility]       OR  Go Online  to [email or web address]      OR   Call  [facility]:  [phone] to assit     IF you do not see Follow UP Appointment appear on  [facility] THEN such means that you do NOT have a follow up appt scheduled. Call Psychiatry Dept to assit: [phone]    Understanding was expressed; and no further concerns or questions were raised at this time.     References:     Relaxation stress reduction workbook: LUCY Marroquin PhD ( used: $7-10)    Feeling Good Website: Herve Wayne MD / www.[employer]uche qui dit Oui website (free) / raudel. PODCASTS    Anxiety &  phobia workbook by ORLY Devries PhD  (web retailers: used: $ 7-10)    [place]: Path to Better Sleep : https://www.veterantraining.[place]/insomnia/ (free)     Pt expressed appreciation for the visit today and did not have further question at this time though pt  was still informed to:     Call  if problems.    Call / Report Side Effects to Psyc MD     Encouraged to follow up with primary care / Gen Med MD for continued monitoring of general health and wellness.    Understanding was expressed; and no further concerns nor questions were raised at this time.     Remember healthy self care:   eat right  attempt adequate rest   HANDWASHING / encourage such raudel. During this corona virus time   walk or light exercise within reason and as your general med team approves  read or explore any of reference materials / homework mentioned  reach out (I.e.,  connect with)  others who nuture and bring out best in you  avoid risky behaviors    Keep your appointments:    IF you  cannot make your appt THEN please call [phone]  or go online (via My Chart judith) to reschedule.    It is the responsibility of the patient to reschedule an appointment if an appointment has been canceled or missed.    Avoid  alcohol and illicit substances.  Look for the positive.  All is  often relative-seek balance  Call sooner if needed : [phone]  Call 911 or go to Emergency Room  (ER)  if  any acute concerns

## 2025-08-06 NOTE — PROGRESS NOTES
Joey Orlin   2025     Disclaimer: Evaluation and treatment is based on information presented to date. Any new information may affect assessment and findings.     NOTE: This note may have been partly created  via use of dictation software which may result in rare (though occasional) transcription errors. If questions, contact this MD via [facility]: [phone]    NOTE: The addition of COLOR FONT and / or FORMATTING (such as use of  Underline or Italics)  may have lj added by GEM Gardiner MD.    The patient location is: Patient's his apt [facility]  (2025)     Visit type: Virtual visit with synchronous audio and video     Each patient to whom he or she provides medical services by telemedicine is: (1) informed of the relationship between the physician and patient and the respective role of any other health care provider with respect to management of the patient; and (2) notified that he or she may decline to receive medical services by telemedicine and may withdraw from such care at any time.    Patient was informed that I am a physician who is licensed in the [facility]:  Herve Gardiner MD:  Employed by   [facility]     If technology issues arise: GEM Gardiner MD will attempt to call pt back     Pt informed that if he / she is ever in crisis (or has acute concerns): pt is instructed to Dial 911 or go to nearest Emergency Room (ER)    Pt informed that if questions related to privacy practices: pt is instructed to contact [facility] Information [facility]:     Understanding Expressed. No questions.      Total Time: 46 min  (date 2025) which includes face to face time and non-face to face time includin)  preparing to see the patient (eg, review of tests), 2) Obtaining and/or reviewing separately obtained history from other medical disciplines; 3) Documenting clinical information in the electronic or other health record, 4) Independently reviewing and interpreting  lab and/ or test results as well as 5)  care coordination as necessary.      S: Patient's Own Perception of Condition (& Side Effects) : 8-6-25: says finds the addition of Abilify 2 mg helpfulf / less irritalbe; calmer ; finds using about 20% less Nicotine vaping; does say still some depression / no SI no HI     O:      CURRENT PRESENTATION:   8-6-25    History using COMBO of  tobacco and cannabis x YEARS / realinged he needed move off that and  SUBSTITUTED to use moved to DAILY VAPING and often THROUGHOUT Day / EXPLAINS the VAPING as means to  MEJIA OFF HEIGHTENED ANXIETY    MDdid strongly encourage [facility] to [facility] or [facility] to develop skills that may assist him with management of anxiety brochure sent via has my [facility] portal and reviewed such with him     Mentions that although he certainly does find the addition of Abilify helpful in decreasing his irritability some; also making him more calm;     He notes still has some residual depression he has been on Wellbutrin  mg; has not advance to 300 mg but now interested to do so; he will stop the 150 mg and I will write for the Wellbutrin  mg he will remain on Abilify 2 mg    We will update at his early September meeting /take a look at advance Abilify and see how he is doing on Wellbutrin  mg    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) and go back school (online [employer]): COMPUTER SCIENCE ; FLEXIBLE SCHEDULING       still enjoys doing competitive CHESS / playing in a club says his score (in rapid chess) is 1 90; says that is at the high end of the average her level    At May 2025 session: Lexapro was replaced to address depression as cited erectile dysfunction ; mutually agreed to move to  Wellbutrin XL mutually agreed to start at Wellbutrin  mg    New counselor at [place] named : Kalee / as prior counselor completed his trainnig program /  "([facility] counselors are supervised )    Prior counselor was noting some evidence for executive function issues in floated idea of ADHD which we have discussed herself I have a previous World Health Organization on the chart; his counselor also did the same World Health Organization screen    'seems' also to reflects some ADHD / tho to better address / still need to get better mood and anxiety BEFORE ADHD more center stage    Nonetheless his MD did discuss with him path of medication management; it was agreed to 1st go forward with the Wellbutrin to help with some mild depression; also hope to further address anxiety issues and AFTER Such  we  can subsequently look at ADHD issues / re further assessment or stimulants tho depends on response to treatment depression/ anxiety / and also optimally backing off frequent vaping of nicotine    He does state that he is largely off marijuana just has had a few gummies over the past 3-4 weeks historically he was using DAILY basis says he stopped that some months ago early 2025 ; have discussed with him potential for urine screens, raudel if treating ADHD /  he expresses understanding    Overall seems a bit more confident less anxious my initial meeting with him     he himself says has moved in a better direction did find that the Lexapro helped "put a floor to steady his mood" yet the sexual dysfunction was an issue for him    More productive   Doing more chores / tasks  Doing Better with career     Without vaping nicotine says feels more anxiety / irritable and claustrophobic     HE says a "key for him is that  IF someone else tells him to do something" / EASY to do it    Yet IF it's just ME // or no feedback / he tends to drift off     Says would work on project API on [employer] back end of zuleika / underlying layer that can     Self Ratings:         8/6/2025     1:31 PM 6/18/2025     5:02 PM 3/26/2025     8:01 AM   PHILL-7   Was test performed? Yes Yes    1. Feeling nervous, anxious, " or on edge? More than half the days More than half the days Several days   2. Not being able to stop or control worrying? More than half the days Several days Several days   3. Worrying too much about different things? More than half the days More than half the days Several days   4. Trouble relaxing? Nearly everyday Not at all Several days   5. Being so restless that it is hard to sit still? Nearly everyday Nearly everyday More than half the days   6. Becoming easily annoyed or irritable? More than half the days Several days Several days   7. Feeling afraid as if something awful might happen? Several days Several days Not at all   8. If you checked off any problems, how difficult have these problems made it for you to do your work, take care of things at home, or get along with other people? Somewhat difficult Somewhat difficult Somewhat difficult   PHILL-7 Score 15 10 7    Number answered (out of first 7) 7 7 7    Interpretation Severe Anxiety Moderate Anxiety Mild Anxiety        Patient-reported          8/6/2025     1:34 PM 6/18/2025     5:12 PM   Depression Patient Health Questionnaire   Over the last two weeks how often have you been bothered by little interest or pleasure in doing things Not at all Not at all   Over the last two weeks how often have you been bothered by feeling down, depressed or hopeless Several days More than half the days   PHQ-2 Total Score 1 2   Over the last two weeks how often have you been bothered by trouble falling or staying asleep, or sleeping too much More than half the days Nearly every day   Over the last two weeks how often have you been bothered by feeling tired or having little energy More than half the days More than half the days   Over the last two weeks how often have you been bothered by a poor appetite or overeating Not at all Not at all   Over the last two weeks how often have you been bothered by feeling bad about yourself - or that you are a failure or have let  "yourself or your family down More than half the days Nearly every day   Over the last two weeks how often have you been bothered by trouble concentrating on things, such as reading the newspaper or watching television More than half the days More than half the days   Over the last two weeks how often have you been bothered by moving or speaking so slowly that other people could have noticed. Or the opposite - being so fidgety or restless that you have been moving around a lot more than usual. More than half the days More than half the days   Over the last two weeks how often have you been bothered by thoughts that you would be better off dead, or of hurting yourself Not at all Not at all   If you checked off any problems, how difficult have these problems made it for you to do your work, take care of things at home or get along with other people? Somewhat difficult Somewhat difficult   PHQ-9 Score 11 14   PHQ-9 Interpretation Moderate Moderate       Constitutional Health Concerns:  No acute concerns now that off Lexapro / re sexual dysfunction     There were no vitals filed for this visit. / telehealth session     Last 3 sets of Vitals        2/6/2025     8:27 AM 3/26/2025     9:10 AM 5/6/2025     1:11 PM   Vitals - 1 value per visit   SYSTOLIC 105 117 113   DIASTOLIC 65 77 76   Pulse 68 102 102   Weight (lb) 142.2 144.84 145.72   Weight (kg) 64.5 65.7 66.1   Height 5' 10" (1.778 m)     BMI (Calculated) 20.4           Laboratory Data  Lab Visit on 07/14/2025   Component Date Value Ref Range Status    Sodium 07/14/2025 141  136 - 145 mmol/L Final    Potassium 07/14/2025 3.6  3.5 - 5.1 mmol/L Final    Chloride 07/14/2025 108  95 - 110 mmol/L Final    CO2 07/14/2025 25  23 - 29 mmol/L Final    Glucose 07/14/2025 104  70 - 110 mg/dL Final    BUN 07/14/2025 16  6 - 20 mg/dL Final    Creatinine 07/14/2025 0.7  0.5 - 1.4 mg/dL Final    Calcium 07/14/2025 9.3  8.7 - 10.5 mg/dL Final    Protein Total 07/14/2025 7.2  6.0 - " 8.4 gm/dL Final    Albumin 07/14/2025 4.3  3.5 - 5.2 g/dL Final    Bilirubin Total 07/14/2025 0.8  0.1 - 1.0 mg/dL Final    ALP 07/14/2025 76  40 - 150 unit/L Final    AST 07/14/2025 22  11 - 45 unit/L Final    ALT 07/14/2025 25  10 - 44 unit/L Final    Anion Gap 07/14/2025 8  8 - 16 mmol/L Final    eGFR 07/14/2025 >60  >60 mL/min/1.73/m2 Final    TSH 07/14/2025 1.942  0.400 - 4.000 uIU/mL Final    HIV 1/2 Ag/Ab 07/14/2025 Non-Reactive  Non-Reactive Final    Hep C Ab Interp 07/14/2025 Non-Reactive  Non-Reactive Final    Cholesterol Total 07/14/2025 166  120 - 199 mg/dL Final    Triglyceride 07/14/2025 251 (H)  30 - 150 mg/dL Final    HDL Cholesterol 07/14/2025 44  40 - 75 mg/dL Final    LDL Cholesterol 07/14/2025 71.8  63.0 - 159.0 mg/dL Final    HDL/Cholesterol Ratio 07/14/2025 26.5  20.0 - 50.0 % Final    Cholesterol/HDL Ratio 07/14/2025 3.8  2.0 - 5.0 Final    Non HDL Cholesterol 07/14/2025 122  mg/dL Final    WBC 07/14/2025 6.59  3.90 - 12.70 K/uL Final    RBC 07/14/2025 4.11 (L)  4.60 - 6.20 M/uL Final    HGB 07/14/2025 13.4 (L)  14.0 - 18.0 gm/dL Final    HCT 07/14/2025 40.3  40.0 - 54.0 % Final    MCV 07/14/2025 98  82 - 98 fL Final    MCH 07/14/2025 32.6 (H)  27.0 - 31.0 pg Final    MCHC 07/14/2025 33.3  32.0 - 36.0 g/dL Final    RDW 07/14/2025 13.2  11.5 - 14.5 % Final    Platelet Count 07/14/2025 312  150 - 450 K/uL Final    MPV 07/14/2025 10.7  9.2 - 12.9 fL Final    Nucleated RBC 07/14/2025 0  <=0 /100 WBC Final    Neut % 07/14/2025 63.5  38 - 73 % Final    Lymph % 07/14/2025 25.6  18 - 48 % Final    Mono % 07/14/2025 8.5  4 - 15 % Final    Eos % 07/14/2025 1.5  <=8 % Final    Basophil % 07/14/2025 0.6  <=1.9 % Final    Imm Grans % 07/14/2025 0.3  0.0 - 0.5 % Final    Neut # 07/14/2025 4.18  1.8 - 7.7 K/uL Final    Lymph # 07/14/2025 1.69  1 - 4.8 K/uL Final    Mono # 07/14/2025 0.56  0.3 - 1 K/uL Final    Eos # 07/14/2025 0.10  <=0.5 K/uL Final    Baso # 07/14/2025 0.04  <=0.2 K/uL Final    Imm Grans #  "07/14/2025 0.02  0.00 - 0.04 K/uL Final      Mental Status Exam:   Appearance: casual   Oriented: x 3   Attitude: cooperative pleasant   Eye Contact: good   Behavior: calm   Mood: says some anxiety depression  Cognition: aler  Concentration:  cites some distractibility  Affect: appropriate range   Anxiety: mild / moderate  Thought Process: goal directed   Speech: Volume : WNL   Quantity WNL   Quality: appears to openly answer questions   Eye Contact: good   Insight: recognizes struggling with some self care / chores; HE says "people tell him" may have SOME Autism qualites and or ADHD   Threats: no SI / no HI   Psychosis: denies all   Estimate of Intellectual Function: upper average   Impulse Control: no history SI / nor HI ; calm here    Musculoskeletal: no tremor     Social:  Says soon going to attend [employer] school for [employer] science; likes chess club      Patient Active Problem List    Diagnosis Date Noted    ADHD (attention deficit hyperactivity disorder), combined type 05/06/2025    Cannabis use disorder, severe, in early remission ; he states: 14yo to 34yo ; (stopped cannbis early 2025  ) DAILY 3 TO 4 OR 5 A DAY// Mixture tobacco and cannabis / // affected grades  no psychosis 03/29/2025    Current every day NICOTINE VAPING / uses frequently thru day //  since Jan 2025 when stopped daily Cannabis 03/29/2025    Anxiety disorder 03/26/2025    High-functioning autism spectrum disorder (mild) elements challenges social cues, restricted interestss 03/26/2025    Chronic midline low back pain without sciatica 02/06/2025      Current Outpatient Medications   Medication Sig    ARIPiprazole (ABILIFY) 2 MG Tab Take 1 tablet (2 mg total) by mouth once daily.    buPROPion (WELLBUTRIN XL) 300 MG 24 hr tablet Take 1 tablet (300 mg total) by mouth once daily.    hepatitis A and B vaccine, PF, (TWINRIX) 720 MURIEL unit- 20 mcg/mL Syrg suspension 3 total doses: today (3/20/25), in one month, then in 6 months     No current " facility-administered medications for this visit.        Review of patient's allergies indicates:   Allergen Reactions    Lexapro [escitalopram] Other (See Comments)     Erectile Dysfunction    Penicillins Other (See Comments)     Pt was told and is unaware of reactions       ASSESSMENT:   Encounter Diagnosis   Name Primary?    Mild episode of recurrent major depressive disorder Yes         At today's appt,  MD addressed long term clinical issues (Autism Spectrum Anxiety ) that are of  Increased Complexity. Note  Collateral issues impacting such issue(s) include : daily and frequent Nicotine Vaping ; history cannabis and tobacco x years tho in remission;  . This MD  1)personally  saw patient ;  obtained additional history ; and 2) reviewed notes of collateral providers (where appropriate).  3) MD assessed long term complex condition(s); 4) discussed with pt; and 5) made treatment decisions in concert with pt (including: med mngt and/or psychotherapy).   Patient Instructions     PLAN:     Follow up   Sept 4 2025 @ 3:30p / Telehealth   AND   Sept 22 2025 @ 3 pm  IN CLINIC    Psyc Meds:   8-6-25 :   Advance to Wellbutrin  mg daily  Continue Abilify 2 mg daily     MD did strongly encourage him to consider [facility] or [facility] call [phone] for more info // Such programming teaches  develop skills that may assist him with management of anxiety brochure sent via has my [facility] portal and reviewed such with him  /     Will consider add stimulant for ADHD  tho only  AFTER get mood med in place that works for him / also optimally he would further decrease his frequent nicotine vaping     Follow up as doing [facility] ; pt  says MD can call and collab with [place] [facility] if needed    MD Reviewed with patient:    Pt was informed of common side effects of psychiatric medications prescribed. As well, patient is directed to read information accompanying their medication  (ie,package Insert) and instructed to contact [facility] If any questions.    Patient is instructed to report side effects or any other problems to the psychiatrist during clinic business hours. For any acute or urgent issues:  Call 911 or go to nearest emergency department.    Please follow up with your  primary care provider and gen medical specialists for continued monitoring of general health and wellness and any medical conditions.    IF you [facility] provider THEN Please establish with a Primary care provider You may visit [email or web address] OR contact your insurer for list of providers.     It is the responsibility of the patient to schedule and / or confirm  follow up apptointment  and to reschedule an appointment if an appointment has been canceled or missed.    You should ALWAYS schedule a Follow up Appt UNLESS such Mutually agreed.    Repeated No Shows to appointments or Late Cancellations MAY result in discharge from Clinic    To schedule or confirm your  Follow Up Psychiatry  appointment (or rescheduling):    Check your My Chart Judith     OR   Stop by [facility]       OR  Go Online  to [email or web address]      OR   Call  [facility]:  [phone] to assit     IF you do not see Follow UP Appointment appear on  [facility] THEN such means that you do NOT have a follow up appt scheduled. Call Psychiatry Dept to assit: [phone]    Understanding was expressed; and no further concerns or questions were raised at this time.     References:     Relaxation stress reduction workbook: LUCY Marroquin PhD ( used: $7-10)    Feeling Good Website: Herve Wayne MD / [email or web address] website (free) / [email or web address]. PODCASTS    Anxiety &  phobia workbook by ORLY Devries PhD  (web retailers: used: $ 7-10)    [place]: Path to Better Sleep : https://www.veterantraining.[place].gov/insomnia/ (free)     Pt expressed appreciation for the visit today and did not have further question at this time though pt  was  still informed to:     Call  if problems.    Call / Report Side Effects to Psyc MD     Encouraged to follow up with primary care / Gen Med MD for continued monitoring of general health and wellness.    Understanding was expressed; and no further concerns nor questions were raised at this time.     Remember healthy self care:   eat right  attempt adequate rest   HANDWASHING / encourage such raudel. During this corona virus time   walk or light exercise within reason and as your general med team approves  read or explore any of reference materials / homework mentioned  reach out (I.e.,  connect with)  others who nuture and bring out best in you  avoid risky behaviors    Keep your appointments:    IF you  cannot make your appt THEN please call [phone]  or go online (via My Chart zuleika) to reschedule.    It is the responsibility of the patient to reschedule an appointment if an appointment has been canceled or missed.    Avoid  alcohol and illicit substances.  Look for the positive.  All is often relative-seek balance  Call sooner if needed : [phone]  Call 911 or go to Emergency Room  (ER)  if  any acute concerns

## 2025-08-11 ENCOUNTER — OFFICE VISIT (OUTPATIENT)
Dept: INTERNAL MEDICINE | Facility: CLINIC | Age: 35
End: 2025-08-11
Payer: COMMERCIAL

## 2025-08-11 ENCOUNTER — LAB VISIT (OUTPATIENT)
Dept: LAB | Facility: HOSPITAL | Age: 35
End: 2025-08-11
Payer: COMMERCIAL

## 2025-08-11 VITALS
WEIGHT: 156.94 LBS | HEIGHT: 70 IN | OXYGEN SATURATION: 97 % | SYSTOLIC BLOOD PRESSURE: 120 MMHG | DIASTOLIC BLOOD PRESSURE: 85 MMHG | HEART RATE: 75 BPM | BODY MASS INDEX: 22.47 KG/M2

## 2025-08-11 DIAGNOSIS — Z87.891 HISTORY OF CIGARETTE SMOKING: ICD-10-CM

## 2025-08-11 DIAGNOSIS — R53.83 FATIGUE, UNSPECIFIED TYPE: Primary | ICD-10-CM

## 2025-08-11 DIAGNOSIS — R53.83 FATIGUE, UNSPECIFIED TYPE: ICD-10-CM

## 2025-08-11 DIAGNOSIS — Z72.0 VAPES NICOTINE CONTAINING SUBSTANCE: ICD-10-CM

## 2025-08-11 DIAGNOSIS — D64.9 ANEMIA, UNSPECIFIED TYPE: Primary | ICD-10-CM

## 2025-08-11 LAB
FOLATE SERPL-MCNC: 14.1 NG/ML (ref 4–24)
VIT B12 SERPL-MCNC: 257 PG/ML (ref 210–950)

## 2025-08-11 PROCEDURE — 99999 PR PBB SHADOW E&M-EST. PATIENT-LVL III: CPT | Mod: PBBFAC,,,

## 2025-08-11 PROCEDURE — 36415 COLL VENOUS BLD VENIPUNCTURE: CPT

## 2025-08-11 PROCEDURE — 82607 VITAMIN B-12: CPT

## 2025-08-11 PROCEDURE — 82746 ASSAY OF FOLIC ACID SERUM: CPT

## 2025-08-12 ENCOUNTER — RESULTS FOLLOW-UP (OUTPATIENT)
Dept: INTERNAL MEDICINE | Facility: CLINIC | Age: 35
End: 2025-08-12
Payer: COMMERCIAL

## 2025-09-04 ENCOUNTER — OFFICE VISIT (OUTPATIENT)
Dept: PSYCHIATRY | Facility: CLINIC | Age: 35
End: 2025-09-04
Payer: COMMERCIAL

## 2025-09-04 DIAGNOSIS — F33.0 MILD EPISODE OF RECURRENT MAJOR DEPRESSIVE DISORDER: ICD-10-CM

## 2025-09-04 DIAGNOSIS — Z72.0 CURRENT EVERY DAY NICOTINE VAPING: ICD-10-CM

## 2025-09-04 DIAGNOSIS — F12.21 CANNABIS USE DISORDER, SEVERE, IN EARLY REMISSION: ICD-10-CM

## 2025-09-04 DIAGNOSIS — F41.9 ANXIETY DISORDER, UNSPECIFIED TYPE: ICD-10-CM

## 2025-09-04 DIAGNOSIS — F90.2 ADHD (ATTENTION DEFICIT HYPERACTIVITY DISORDER), COMBINED TYPE: ICD-10-CM

## 2025-09-04 DIAGNOSIS — F84.0 HIGH-FUNCTIONING AUTISM SPECTRUM DISORDER: Primary | ICD-10-CM

## 2025-09-06 ENCOUNTER — TELEPHONE (OUTPATIENT)
Dept: PSYCHIATRY | Facility: HOSPITAL | Age: 35
End: 2025-09-06
Payer: COMMERCIAL

## 2025-09-06 DIAGNOSIS — F84.0 HIGH-FUNCTIONING AUTISM SPECTRUM DISORDER: ICD-10-CM

## 2025-09-06 DIAGNOSIS — F33.0 MILD EPISODE OF RECURRENT MAJOR DEPRESSIVE DISORDER: Primary | ICD-10-CM

## 2025-09-06 RX ORDER — ASENAPINE 5 MG/1
5 TABLET SUBLINGUAL NIGHTLY
Qty: 30 TABLET | Refills: 0 | Status: SHIPPED | OUTPATIENT
Start: 2025-09-06 | End: 2025-10-06